# Patient Record
Sex: MALE | Race: AMERICAN INDIAN OR ALASKA NATIVE | NOT HISPANIC OR LATINO | Employment: FULL TIME | URBAN - METROPOLITAN AREA
[De-identification: names, ages, dates, MRNs, and addresses within clinical notes are randomized per-mention and may not be internally consistent; named-entity substitution may affect disease eponyms.]

---

## 2017-01-27 LAB
CRP SERPL-MCNC: 1 MG/L (ref 0–4.9)
ERYTHROCYTE [SEDIMENTATION RATE] IN BLOOD BY WESTERGREN METHOD: 2 MM/HR (ref 0–30)

## 2017-02-02 ENCOUNTER — TELEPHONE (OUTPATIENT)
Dept: SPORTS MEDICINE | Facility: CLINIC | Age: 62
End: 2017-02-02

## 2017-02-02 DIAGNOSIS — M25.559 ARTHRALGIA OF HIP, UNSPECIFIED LATERALITY: Primary | ICD-10-CM

## 2017-02-02 NOTE — TELEPHONE ENCOUNTER
Possible hip scope, psoas tenotomy, and extensive debridement pending injection with Dr. Diamond and f/u exam with Dr. Tellez

## 2017-02-06 ENCOUNTER — TELEPHONE (OUTPATIENT)
Dept: INTERNAL MEDICINE | Facility: CLINIC | Age: 62
End: 2017-02-06

## 2017-02-12 RX ORDER — LIDOCAINE HYDROCHLORIDE 10 MG/ML
10 INJECTION INFILTRATION; PERINEURAL ONCE
Status: CANCELLED | OUTPATIENT
Start: 2017-02-12 | End: 2017-02-12

## 2017-02-13 ENCOUNTER — HOSPITAL ENCOUNTER (OUTPATIENT)
Facility: OTHER | Age: 62
Discharge: HOME | End: 2017-02-13
Attending: ANESTHESIOLOGY | Admitting: ANESTHESIOLOGY
Payer: COMMERCIAL

## 2017-02-13 ENCOUNTER — HOSPITAL ENCOUNTER (OUTPATIENT)
Dept: RADIOLOGY | Facility: HOSPITAL | Age: 62
Discharge: HOME | End: 2017-02-13
Attending: ORTHOPAEDIC SURGERY | Admitting: ANESTHESIOLOGY
Payer: COMMERCIAL

## 2017-02-13 ENCOUNTER — SURGERY (OUTPATIENT)
Age: 62
End: 2017-02-13

## 2017-02-13 ENCOUNTER — OFFICE VISIT (OUTPATIENT)
Dept: ORTHOPEDICS | Facility: CLINIC | Age: 62
End: 2017-02-13
Payer: COMMERCIAL

## 2017-02-13 ENCOUNTER — OFFICE VISIT (OUTPATIENT)
Dept: SPORTS MEDICINE | Facility: CLINIC | Age: 62
End: 2017-02-13
Payer: COMMERCIAL

## 2017-02-13 VITALS
HEART RATE: 54 BPM | WEIGHT: 225 LBS | SYSTOLIC BLOOD PRESSURE: 153 MMHG | TEMPERATURE: 98 F | HEIGHT: 76 IN | RESPIRATION RATE: 18 BRPM | OXYGEN SATURATION: 96 % | DIASTOLIC BLOOD PRESSURE: 75 MMHG | BODY MASS INDEX: 27.4 KG/M2

## 2017-02-13 VITALS
WEIGHT: 225 LBS | BODY MASS INDEX: 27.4 KG/M2 | HEIGHT: 76 IN | DIASTOLIC BLOOD PRESSURE: 84 MMHG | HEART RATE: 66 BPM | SYSTOLIC BLOOD PRESSURE: 131 MMHG

## 2017-02-13 VITALS
SYSTOLIC BLOOD PRESSURE: 131 MMHG | HEART RATE: 66 BPM | BODY MASS INDEX: 27.4 KG/M2 | DIASTOLIC BLOOD PRESSURE: 84 MMHG | WEIGHT: 225 LBS | HEIGHT: 76 IN

## 2017-02-13 DIAGNOSIS — M25.552 LEFT HIP PAIN: ICD-10-CM

## 2017-02-13 DIAGNOSIS — M25.559 ARTHRALGIA OF HIP, UNSPECIFIED LATERALITY: Primary | ICD-10-CM

## 2017-02-13 DIAGNOSIS — M25.552 LEFT HIP PAIN: Primary | ICD-10-CM

## 2017-02-13 DIAGNOSIS — R52 PAIN: ICD-10-CM

## 2017-02-13 DIAGNOSIS — M70.70 ILIOPSOAS BURSITIS: ICD-10-CM

## 2017-02-13 DIAGNOSIS — S76.019S STRAIN OF HIP AND THIGH, UNSPECIFIED LATERALITY, SEQUELA: Primary | ICD-10-CM

## 2017-02-13 DIAGNOSIS — S76.919S STRAIN OF HIP AND THIGH, UNSPECIFIED LATERALITY, SEQUELA: Primary | ICD-10-CM

## 2017-02-13 DIAGNOSIS — S76.019S: ICD-10-CM

## 2017-02-13 PROCEDURE — 99213 OFFICE O/P EST LOW 20 MIN: CPT | Mod: S$GLB,,, | Performed by: ORTHOPAEDIC SURGERY

## 2017-02-13 PROCEDURE — 73502 X-RAY EXAM HIP UNI 2-3 VIEWS: CPT | Mod: TC,PO,LT

## 2017-02-13 PROCEDURE — 25500020 PHARM REV CODE 255: Performed by: ANESTHESIOLOGY

## 2017-02-13 PROCEDURE — 20610 DRAIN/INJ JOINT/BURSA W/O US: CPT | Mod: LT,,, | Performed by: ANESTHESIOLOGY

## 2017-02-13 PROCEDURE — 73502 X-RAY EXAM HIP UNI 2-3 VIEWS: CPT | Mod: 26,LT,, | Performed by: RADIOLOGY

## 2017-02-13 PROCEDURE — 20610 DRAIN/INJ JOINT/BURSA W/O US: CPT | Performed by: ANESTHESIOLOGY

## 2017-02-13 PROCEDURE — 77002 NEEDLE LOCALIZATION BY XRAY: CPT | Mod: 26,59,, | Performed by: ANESTHESIOLOGY

## 2017-02-13 PROCEDURE — 63600175 PHARM REV CODE 636 W HCPCS: Performed by: PHYSICAL MEDICINE & REHABILITATION

## 2017-02-13 PROCEDURE — 99999 PR PBB SHADOW E&M-EST. PATIENT-LVL III: CPT | Mod: PBBFAC,,, | Performed by: ORTHOPAEDIC SURGERY

## 2017-02-13 PROCEDURE — 99203 OFFICE O/P NEW LOW 30 MIN: CPT | Mod: S$GLB,,, | Performed by: ORTHOPAEDIC SURGERY

## 2017-02-13 PROCEDURE — 99999 PR PBB SHADOW E&M-EST. PATIENT-LVL IV: CPT | Mod: PBBFAC,,, | Performed by: ORTHOPAEDIC SURGERY

## 2017-02-13 PROCEDURE — 25000003 PHARM REV CODE 250: Performed by: PHYSICAL MEDICINE & REHABILITATION

## 2017-02-13 PROCEDURE — 77002 NEEDLE LOCALIZATION BY XRAY: CPT | Performed by: ANESTHESIOLOGY

## 2017-02-13 PROCEDURE — 25000003 PHARM REV CODE 250: Performed by: ANESTHESIOLOGY

## 2017-02-13 RX ORDER — LOSARTAN POTASSIUM 50 MG/1
50 TABLET ORAL EVERY MORNING
COMMUNITY

## 2017-02-13 RX ORDER — BUPIVACAINE HYDROCHLORIDE 2.5 MG/ML
10 INJECTION, SOLUTION EPIDURAL; INFILTRATION; INTRACAUDAL ONCE
Status: DISCONTINUED | OUTPATIENT
Start: 2017-02-13 | End: 2017-02-13 | Stop reason: HOSPADM

## 2017-02-13 RX ORDER — ESZOPICLONE 1 MG/1
3 TABLET, FILM COATED ORAL NIGHTLY PRN
COMMUNITY
Start: 2017-02-03

## 2017-02-13 RX ORDER — LIDOCAINE HYDROCHLORIDE 10 MG/ML
INJECTION INFILTRATION; PERINEURAL
Status: DISCONTINUED | OUTPATIENT
Start: 2017-02-13 | End: 2017-02-13 | Stop reason: HOSPADM

## 2017-02-13 RX ORDER — SODIUM CHLORIDE 9 MG/ML
10 INJECTION, SOLUTION INTRAMUSCULAR; INTRAVENOUS; SUBCUTANEOUS ONCE
Status: DISCONTINUED | OUTPATIENT
Start: 2017-02-13 | End: 2017-02-13 | Stop reason: HOSPADM

## 2017-02-13 RX ORDER — TRIAMCINOLONE ACETONIDE 40 MG/ML
40 INJECTION, SUSPENSION INTRA-ARTICULAR; INTRAMUSCULAR ONCE
Status: COMPLETED | OUTPATIENT
Start: 2017-02-13 | End: 2017-02-13

## 2017-02-13 RX ORDER — BUPIVACAINE HYDROCHLORIDE 2.5 MG/ML
INJECTION, SOLUTION INFILTRATION; PERINEURAL
Status: DISCONTINUED | OUTPATIENT
Start: 2017-02-13 | End: 2017-02-13 | Stop reason: HOSPADM

## 2017-02-13 RX ORDER — VORTIOXETINE 20 MG/1
TABLET, FILM COATED ORAL EVERY MORNING
COMMUNITY
Start: 2017-01-26

## 2017-02-13 RX ORDER — ASPIRIN 81 MG/1
81 TABLET ORAL EVERY MORNING
COMMUNITY
End: 2017-03-03

## 2017-02-13 RX ORDER — ESZOPICLONE 3 MG/1
3 TABLET, FILM COATED ORAL NIGHTLY
COMMUNITY
Start: 2017-01-30

## 2017-02-13 RX ORDER — ESZOPICLONE 2 MG/1
3 TABLET, FILM COATED ORAL NIGHTLY
COMMUNITY
End: 2017-02-14 | Stop reason: DRUGHIGH

## 2017-02-13 RX ORDER — CELECOXIB 200 MG/1
200 CAPSULE ORAL 2 TIMES DAILY
Refills: 1 | COMMUNITY
Start: 2016-12-03 | End: 2017-02-14

## 2017-02-13 RX ADMIN — TRIAMCINOLONE ACETONIDE 40 MG: 40 INJECTION, SUSPENSION INTRA-ARTICULAR; INTRAMUSCULAR at 12:02

## 2017-02-13 RX ADMIN — SODIUM BICARBONATE 5 ML: 0.2 INJECTION, SOLUTION INTRAVENOUS at 12:02

## 2017-02-13 RX ADMIN — IOHEXOL 50 ML: 300 INJECTION, SOLUTION INTRAVENOUS at 12:02

## 2017-02-13 RX ADMIN — BUPIVACAINE HYDROCHLORIDE 10 ML: 2.5 INJECTION, SOLUTION INFILTRATION; PERINEURAL at 12:02

## 2017-02-13 RX ADMIN — LIDOCAINE HYDROCHLORIDE 20 ML: 10 INJECTION, SOLUTION INFILTRATION; PERINEURAL at 12:02

## 2017-02-13 NOTE — IP AVS SNAPSHOT
[facility] [place], operated by [facility]  [address] [place], [place] [address]  Phone: [phone]           Patient Discharge Instructions     Our goal is to set you up for success. This packet includes information on your condition, medications, and your home care. It will help you to care for yourself so you don't get sicker and need to go back to the hospital.     Please ask your nurse if you have any questions.        There are many details to remember when preparing to leave the hospital. Here is what you will need to do:    1. Take your medicine. If you are prescribed medications, review your Medication List in the following pages. You may have new medications to  at the pharmacy and others that you'll need to stop taking. Review the instructions for how and when to take your medications. Talk with your doctor or nurses if you are unsure of what to do.     2. Go to your follow-up appointments. Specific follow-up information is listed in the following pages. Your may be contacted by a transition nurse or clinical provider about future appointments. Be sure we have all of the phone numbers to reach you, if needed. Please contact your provider's office if you are unable to make an appointment.     3. Watch for warning signs. Your doctor or nurse will give you detailed warning signs to watch for and when to call for assistance. These instructions may also include educational information about your condition. If you experience any of warning signs to your health, call your doctor.               [facility]  Unless otherwise directed by your provider, please contact [facility], our nurse care line that is available for 24/7 assistance.     [phone] (toll-free)    Registered nurses in the [facility] provide clinical advisement, health education, appointment booking, and other advisory services.                    ** Verify the list of medication(s) below is accurate and up to  date. Carry this with you in case of emergency. If your medications have changed, please notify your healthcare provider.             Medication List      ASK your doctor about these medications        Additional Info                      eszopiclone 2 MG Tab   Commonly known as:  LUNESTA   Refills:  0   Dose:  3 mg   Indications:  Insomnia    Instructions:  Take 3 mg by mouth every evening.     Begin Date    AM    Noon    PM    Bedtime       losartan 50 MG tablet   Commonly known as:  COZAAR   Refills:  0   Dose:  50 mg   Indications:  Hypertension    Instructions:  Take 50 mg by mouth once daily.     Begin Date    AM    Noon    PM    Bedtime                  Please bring to all follow up appointments:    1. A copy of your discharge instructions.  2. All medicines you are currently taking in their original bottles.  3. Identification and insurance card.    Please arrive 15 minutes ahead of scheduled appointment time.    Please call 24 hours in advance if you must reschedule your appointment and/or time.        Your Future Surgeries/Procedures     Feb 14, 2017   Surgery with Jazmine Tellez MD   [facility] ([facility])    [address]  [facility] [id number] [phone]              Follow-up Information     Call Rian Diamond MD.    Specialty:  Pain Medicine    Why:  As needed, If symptoms worsen    Contact information:    [address]  [address]  [facility] [id number] [phone]            Discharge Instructions       Home Care Instructions Pain Management:    1. DIET:   You may resume your normal diet today.   2. BATHING:   You may shower with luke warm water.  3. DRESSING:   You may remove your bandage today.   4. ACTIVITY LEVEL:   You may resume your normal activities 24 hrs after your procedure.  5. MEDICATIONS:   You may resume your normal medications today.   6. SPECIAL INSTRUCTIONS:   No heat to the injection site for 24 hrs including, bath or shower, heating pad,  "moist heat, or hot tubs.    Use ice pack to injection site for any pain or discomfort.  Apply ice packs for 20 minute intervals as needed.   If you have received any sedatives by mouth today you may not drive for 12 hours.    If you have received any sedation through your IV, you may not drive for 24 hrs.     PLEASE CALL YOUR DOCTOR IF:  1. Redness or swelling around the injection site.  2. Fever of 101 degrees  3. Drainage (pus) from the injection site.  4. For any continuous bleeding (some dried blood over the incision is normal.)    FOR EMERGENCIES:   If any unusual problems or difficulties occur during clinic hours, call [phone] or 314.         Admission Information     Date & Time Provider Department CSN    2/13/2017 11:49 AM Rian Diamond MD [facility] [id number]      Care Providers     Provider Role Specialty Primary office phone    Rian Diamond MD Attending Provider Pain Medicine [phone]    Rian Diamond MD Surgeon  Pain Medicine [phone]      Your Vitals Were     BP Pulse Temp Resp Height Weight    153/75 (BP Location: Right arm, Patient Position: Lying, BP Method: Automatic) 54 97.7 °F (36.5 °C) (Oral) 18 6' 4" (1.93 m) 102.1 kg (225 lb)    SpO2 BMI             96% 27.39 kg/m2         Recent Lab Values     No lab values to display.      Allergies as of 2/13/2017     No Known Allergies      Advance Directives     An advance directive is a document which, in the event you are no longer able to make decisions for yourself, tells your healthcare team what kind of treatment you do or do not want to receive, or who you would like to make those decisions for you.  If you do not currently have an advance directive, [facility] encourages you to create one.  For more information call:  [phone]), [phone] ([phone]),  or log on to [email or web address].        Language Assistance Services     ATTENTION: Language assistance services are available, free of " charge. Please call [phone].      ATENCIÓN: Si tiffanie nevarez, tiene a austin disposición servicios gratuitos de asistencia lingüística. Llame al [phone].     CHÚ Ý: N?u b?n nói Ti?ng Vi?t, có các d?ch v? h? tr? ngôn ng? mi?n phí dành cho b?n. G?i s? [phone].        [facility] Sign-Up     Activating your [facility] account is as easy as 1-2-3!     1) Visit [email or web address], select Sign Up Now, enter this activation code and your date of birth, then select Next.  [email or web address]  Expires: 3/30/2017  9:24 AM      2) Create a username and password to use when you visit [facility] in the future and select a security question in case you lose your password and select Next.    3) Enter your e-mail address and click Sign Up!    Additional Information  If you have questions, please e-mail [email or web address] or call [phone] to talk to our [facility] staff. Remember, [facility] is NOT to be used for urgent needs. For medical emergencies, dial 911.          [facility] complies with applicable Federal civil rights laws and does not discriminate on the basis of race, color, national origin, age, disability, or sex.

## 2017-02-13 NOTE — OP NOTE
Ischial Tuberosity injection  Time-out taken to identify patient and procedure side prior to starting   the procedure.   Date of Service: 02/13/2017    PCP: Jewel Hussein MD      PROCEDURE: left iliopsoas bursa injection under fluoroscopy.   REASON FOR PROCEDURE: left iliopsoas bursitis     PHYSICIAN: Rian Diamond MD  ASSISTANTS: Mike Torres Jr MD, Bj Perera DO   ANESTHESIA: Xylocaine 1% 9ml with Sodium Bicarbonate 1ml. 3ml per site.   Preop meds: Niravam 0.5mg  MEDICATIONS INJECTED: Kenalog 20mg, bupivacaine 0.25% 8 mL, and sterile saline 2ml  ESTIMATED BLOOD LOSS: None.   COMPLICATIONS: None.   TECHNIQUE: With the patient lying in the supine position the point of tenderness over the iliopsoas bursa was palpated. The area was prepped and draped in the usual   sterile fashion. Local anesthetic was used, given by raising a wheal and   going down to the hub of the 27-gauge needle. A 3-1/2 inch 22-gauge   needle was introduced under fluoroscopy until the tip reached the greater   trochanter. The tip of the needle was advanced into the left iliopsoas bursa. When the tip of the needle was thought   to be in appropriate position, contrast was injected to confirm proper placement. Medication was then injected slowly. The patient tolerated the procedure well.   PAIN BEFORE THE PROCEDURE: 7/10.   PAIN AFTER THE PROCEDURE: 3/10.   The patient was monitored for 20-30 minutes after the procedure and was   given post procedure and discharge instructions to follow at home. The   patient is to follow-up with me in two weeks by calling. The patient was discharged in a stable condtion.

## 2017-02-13 NOTE — MR AVS SNAPSHOT
[facility]  [address]  [facility] [address]  Phone: [phone]                  Rasta Brewster   2017 9:15 AM   Appointment    Description:  Male : 1955   Provider:  Jazmine Tellez MD   Department:  [facility]                To Do List           Goals (5 Years of Data)     None      [facility]     [facility] Nurse Care Line -  Assistance  Registered nurses in the [facility] provide clinical advisement, health education, appointment booking, and other advisory services.  Call for this free service at [phone].             Medications           Message regarding Medications     Verify the changes and/or additions to your medication regime listed below are the same as discussed with your clinician today.  If any of these changes or additions are incorrect, please notify your healthcare provider.             Verify that the below list of medications is an accurate representation of the medications you are currently taking.  If none reported, the list may be blank. If incorrect, please contact your healthcare provider. Carry this list with you in case of emergency.                Clinical Reference Information           Allergies as of 2017     No Known Allergies      Immunizations Administered on Date of Encounter - 2017     None      [facility] Sign[facility]Up     Activating your [facility] account is as easy as 1-2-3!     1) Visit [email or web address], select Sign Up Now, enter this activation code and your date of birth, then select Next.  [email or web address]  Expires: 3/30/2017  9:24 AM      2) Create a username and password to use when you visit [facility] in the future and select a security question in case you lose your password and select Next.    3) Enter your e-mail address and click Sign Up!    Additional Information  If you have questions, please e-mail [email or web address] or call [phone] to talk to our [facility]  staff. Remember, [facility] is NOT to be used for urgent needs. For medical emergencies, dial 911.         Language Assistance Services     ATTENTION: Language assistance services are available, free of charge. Please call [phone].      ATENCIÓN: Si habla roque, tiene a austin disposición servicios gratuitos de asistencia lingüística. Llame al [phone].     CHÚ Ý: N?u b?n nói Ti?ng Vi?t, có các d?ch v? h? tr? ngôn ng? mi?n phí dành cho b?n. G?i s? [phone].         [facility] complies with applicable Federal civil rights laws and does not discriminate on the basis of race, color, national origin, age, disability, or sex.

## 2017-02-13 NOTE — PROGRESS NOTES
CC:left hip pain    HPI:   Rasta Brewster is a pleasant 61 y.o. patient who reports to clinic with left hip pain. Today the patient rates pain at a 5/10 on visual analog scale.        2 months duration, - traumatic [facility], [facility]  Getting in and out of car + pain    He is s/p left ROCCO (anterior) about 14 weeks ago. Initially did well but developed left hip pain 4-5 weeks post-op. Was put on crutches without much relief. Has tried celebrex without much relief.   He works as the  of a company.     Affecting ADLs and exercising      Review of Systems   Constitution: Negative. Negative for chills, fever and night sweats.   HENT: Negative for congestion and headaches.    Eyes: Negative for blurred vision, left vision loss and right vision loss.   Cardiovascular: Negative for chest pain and syncope.   Respiratory: Negative for cough and shortness of breath.    Endocrine: Negative for polydipsia, polyphagia and polyuria.   Hematologic/Lymphatic: Negative for bleeding problem. Does not bruise/bleed easily.   Skin: Negative for dry skin, itching and rash.   Musculoskeletal: Negative for falls and muscle weakness.   Gastrointestinal: Negative for abdominal pain and bowel incontinence.   Genitourinary: Negative for bladder incontinence and nocturia.   Neurological: Negative for disturbances in coordination, loss of balance and seizures.   Psychiatric/Behavioral: Negative for depression. The patient does not have insomnia.    Allergic/Immunologic: Negative for hives and persistent infections.     PAST MEDICAL HISTORY:   Past Medical History   Diagnosis Date    Anxiety     Hypertension      PAST SURGICAL HISTORY:   Past Surgical History   Procedure Laterality Date    Back surgery      Elbow surgery      Foot surgery      Hip surgery      Joint replacement      Knee arthroscopy      Shoulder arthroscopy       FAMILY HISTORY: No family history on file.  SOCIAL HISTORY:   Social History     Social  "History    Marital status: Single     Spouse name: N/A    Number of children: N/A    Years of education: N/A     Occupational History    Not on file.     Social History Main Topics    Smoking status: Never Smoker    Smokeless tobacco: Never Used    Alcohol use Yes      Comment: occasionally 2-3    Drug use: No    Sexual activity: Not on file     Other Topics Concern    Not on file     Social History Narrative    No narrative on file       MEDICATIONS:   Current Outpatient Prescriptions:     eszopiclone (LUNESTA) 2 MG Tab, Take 3 mg by mouth every evening., Disp: , Rfl:     losartan (COZAAR) 50 MG tablet, Take 50 mg by mouth once daily., Disp: , Rfl:   ALLERGIES: Review of patient's allergies indicates:  No Known Allergies    VITAL SIGNS:   Visit Vitals    /84    Pulse 66    Ht 6' 4" (1.93 m)    Wt 102.1 kg (225 lb)    BMI 27.39 kg/m2          PHYSICAL EXAM / HIP  PHYSICAL EXAMINATION  General:  The patient is alert and oriented x 3.  Mood is pleasant.  Observation of ears, eyes and nose reveal no gross abnormalities.  HEENT: NCAT, sclera nonicteric  Lungs: Respirations are equal and unlabored.    left HIP EXAMINATION     OBSERVATION / INSPECTION  Gait:   Nonantalgic   Alignment:  Neutral   Scars:   None   Muscle atrophy: None   Effusion:  None   Warmth:  None   Discoloration:   None   Leg lengths:   Equal   Pelvis:    Level     TENDERNESS / CREPITUS (T/C):      T / C  Trochanteric bursa   - / -  Piriformis    - / -  SI joint    - / -  Psoas tendon   + / -  Rectus insertion  - / -  Adductor insertion  - / -  Pubic symphysis  - / -    ROM: (* = pain)    Flexion:    120 degrees*  External rotation: 30 degrees  Internal rotation with axial load: 10 degrees*  Internal rotation without axial load: 20 degrees  Abduction:  30 degrees*  Adduction:   20 degrees    SPECIAL TESTS:  Pain w/ forced internal rotation (FADIR): +   Pain w/ forced external rotation (ISAIAS): Absent   ISAIAS asymmetry: "     +  Circumduction test:    +  Stinchfield test:    POS  Log roll:      Negative   Snapping hip (internal):   Negative   Sit-up pain:     POS  Resisted sit-up pain:    Negative   Resisted sit-up w/ adductor contraction pain:Negative   Step-down test:    +  Trendelenburg test:    Negative   Bridge test     +    EXTREMITY NEURO-VASCULAR EXAMINATION:   Sensation:  Grossly intact to light touch all dermatomal regions.   Motor Function:  Fully intact motor function at hip, knee, foot and ankle    DTRs;  quadriceps and  achilles 2+.  No clonus and downgoing Babinski.    Vascular status:  DP and PT pulses 2+, brisk capillary refill, symmetric.    Skin: intact, compartments soft.    OTHER FINDINGS:      XRAYS:  AP and Lateral left hip show left ROCCO in place. Anteversion of cup 35 degree.   Decreased offset compared to right side.       A/P  left hip pain, possible impingement vs psoas tendon dysfunction     Anesthetic & cortisone injection to left hip with Dr. Diamond   If relief, will plan for left hip arthroscopy with psoas release     Non-op vs. Operative intervention risks and benefits explained in detail  All questions were answered, pt will contact us for questions or concerns in the interim.

## 2017-02-13 NOTE — PROGRESS NOTES
CC:   left hip pain  HPI:  61 y.o. yo male who presents with left hip pain.      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  The patient stated his left hip began hurting many years ago which culminated in a left anterior total hip replacement 12 weeks ago.  He states that the pain initially went away but then over the first few weeks began hurting significantly almost even worse than before surgery.  The pain is sharp and stabbing.  The pain is mostly in the groin and at the superior part of the greater trochanter.  It causes him to limp significantly.  He uses a cane as an assistive device.  He Kenalog workout as result of this pain.  It affects him daily.  He has been worked up for infection and psoas impingement.  CRP was normal and he got no relief from the so as injection.  He denies any numbness or coldness or weakness in the left lower extremity.      PAST MEDICAL HISTORY:   Past Medical History   Diagnosis Date    Anxiety     Hypertension      PAST SURGICAL HISTORY:   Past Surgical History   Procedure Laterality Date    Back surgery      Elbow surgery      Foot surgery      Hip surgery      Joint replacement      Knee arthroscopy      Shoulder arthroscopy       FAMILY HISTORY: History reviewed. No pertinent family history.  SOCIAL HISTORY:   Social History     Social History    Marital status:      Spouse name: N/A    Number of children: N/A    Years of education: N/A     Occupational History    Not on file.     Social History Main Topics    Smoking status: Never Smoker    Smokeless tobacco: Never Used    Alcohol use Yes      Comment: occasionally 2-3    Drug use: No    Sexual activity: Not on file     Other Topics Concern    Not on file     Social History Narrative       MEDICATIONS:   Current Outpatient Prescriptions:     aspirin (ECOTRIN) 81 MG EC tablet, Take 81 mg by mouth., Disp: , Rfl:     celecoxib (CELEBREX) 200 MG capsule, Take 200 mg  "by mouth 2 (two) times daily., Disp: , Rfl: 1    eszopiclone (LUNESTA) 1 MG Tab, , Disp: , Rfl:     eszopiclone (LUNESTA) 2 MG Tab, Take 3 mg by mouth every evening., Disp: , Rfl:     eszopiclone 3 mg Tab, , Disp: , Rfl:     losartan (COZAAR) 50 MG tablet, Take 50 mg by mouth once daily., Disp: , Rfl:     TRINTELLIX 20 mg Tab, , Disp: , Rfl:   No current facility-administered medications for this visit.   ALLERGIES: Review of patient's allergies indicates:  No Known Allergies    VITAL SIGNS:   Visit Vitals    /84 (BP Location: Left arm, Patient Position: Sitting, BP Method: Automatic)    Pulse 66    Ht 6' 4" (1.93 m)    Wt 102.1 kg (225 lb)    BMI 27.39 kg/m2        Review of Systems   Constitution: Negative. Negative for chills, fever and night sweats.   HENT: Negative for congestion and headaches.    Eyes: Negative for blurred vision, left vision loss and right vision loss.   Cardiovascular: Negative for chest pain and syncope.   Respiratory: Negative for cough and shortness of breath.    Endocrine: Negative for polydipsia, polyphagia and polyuria.   Hematologic/Lymphatic: Negative for bleeding problem. Does not bruise/bleed easily.   Skin: Negative for dry skin, itching and rash.   Musculoskeletal: Negative for falls and muscle weakness.  left hip pain  Gastrointestinal: Negative for abdominal pain and bowel incontinence.   Genitourinary: Negative for bladder incontinence and nocturia.   Neurological: Negative for disturbances in coordination, loss of balance and seizures.   Psychiatric/Behavioral: Negative for depression. The patient does not have insomnia.    Allergic/Immunologic: Negative for hives and persistent infections.       Physical Exam   Constitutional: Oriented to person, place, and time. Appears well-developed and well-nourished.   HENT:   Head: Normocephalic and atraumatic.   Nose: Nose normal.   Eyes: No scleral icterus.   Neck: Normal range of motion. Neck supple. "   Cardiovascular: Normal rate and regular rhythm.    Pulses:       Radial pulses are 2+ on the right side, and 2+ on the left side.   Pulmonary/Chest: Clear and normal  Abdominal: Soft.   Neurological: Alert and oriented to person, place, and time.   Skin: Skin is warm.   Psychiatric: Normal mood and affect.     His incision is clean dry and intact.  It is approximately 5 cm long.  On examination of his right lower extremity he has full range of motion with internal rotation to 30° external rotation is 70° and flexion to 90°.  There is no pain.  He is neurovascular intact in the right lower extremity.    On examination of the left lower extremity he has internal rotation to 5° maybe 10° which causes significant pain.  External rotation to 60° causes no pain.  Abduction causes significant pain and hip flexion causes significant pain there in the groin.  He is neurovascular intact in the left lower extremity.       Xrays:  Of the left hip are reviewed and my interpretation is as follows: He has an oversize cup which appears to be loose.  His stem has subsided and appears to be undersized.  He is approximately 2 cm short.    Assessment:  Encounter Diagnoses   Name Primary?    Left hip pain Yes       Plan:    Orders Placed This Encounter    CT Lower Extremity Without Contrast Left    Case Request Operating Room: REPLACEMENT-HIP-TOTAL WITH NAVIGATION revision. aleena. posterior.       Return for surgery.      We are very long discussion today about potential treatment options.  Given that he has evidence of subsidence I would like to see his previous x-rays.  Further he is offset is significantly reduced and his leg length is short.  We did discussion about revising to the anterior approach versus posterior approach.  I believe that the most safe approach for this is the posterior approach and he agrees.  He knows that there will be limitations with the posterior approach.  He also knows that there will be  limitations in having a revision surgery in the cup occasions could potentially make it worse than before surgery.  Given all this information both he and I feel that revision is warranted in necessary.  We will proceed with revision surgery on an urgent basis as this is affecting his activities of daily living and has a potential for even worse outcomes if it continues to subside or fractures.    Treatment options were discussed the patient. The surgical process of revision  hip replacement was discussed in detail with Rasta Brewster including a detailed discussion of the procedure itself including bearing options and prognosis. The typical perioperative and post-operative course was discussed and perioperative risks were discussed to the patient's satisfaction.  Risks and complications discussed included but were not limited to the risks of anesthetic complications, infection, fracture, bleeding, wound healing complications, further surgery,  aseptic loosening, instability, limb length inequality, neurologic dysfunction including numbness and wesakness,  DVT, pulmonary embolism, perioperative medical risks (cardiac, pulmonary, renal, neurologic), and death and the patient elects to proceed. The patient is aware of the increased risk of complications with revision surgery.      After the discussion, all patient questions were answered and the patient voiced understanding and was satisfied with proceeding with the procedure.    The patient will obtain medical clearance.

## 2017-02-13 NOTE — LETTER
February 13, 2017      Jazmine Tellez MD  [address]  [facility] [address]           [facility]  [address]  [place] [address]  Phone: [phone]          Patient: Rasta Brewster   MR Number: [id number]   YOB: 1955   Date of Visit: 2/13/2017       Dear Dr. Jazmine Tellez:    Thank you for referring Rasta Brewster to me for evaluation. Attached you will find relevant portions of my assessment and plan of care.    If you have questions, please do not hesitate to call me. I look forward to following Rasta Brewster along with you.    Sincerely,    Alec Montalvo MD    Enclosure  CC:  No Recipients    If you would like to receive this communication electronically, please contact [email or web address] or [phone] to request more information on [facility] [employer] access.    For providers and/or their staff who would like to refer a patient to [facility], please contact us through our one-stop-shop provider referral line, [facility] , at [phone].    If you feel you have received this communication in error or would no longer like to receive these types of communications, please e-mail [email or web address]

## 2017-02-13 NOTE — DISCHARGE INSTRUCTIONS

## 2017-02-14 ENCOUNTER — HOSPITAL ENCOUNTER (OUTPATIENT)
Dept: RADIOLOGY | Facility: HOSPITAL | Age: 62
Discharge: HOME | DRG: 468 | End: 2017-02-14
Attending: NURSE PRACTITIONER
Payer: COMMERCIAL

## 2017-02-14 ENCOUNTER — INITIAL CONSULT (OUTPATIENT)
Dept: INTERNAL MEDICINE | Facility: CLINIC | Age: 62
DRG: 468 | End: 2017-02-14
Payer: COMMERCIAL

## 2017-02-14 ENCOUNTER — LAB VISIT (OUTPATIENT)
Dept: LAB | Facility: HOSPITAL | Age: 62
End: 2017-02-14
Attending: ANESTHESIOLOGY
Payer: COMMERCIAL

## 2017-02-14 ENCOUNTER — HOSPITAL ENCOUNTER (OUTPATIENT)
Dept: CARDIOLOGY | Facility: CLINIC | Age: 62
Discharge: HOME | DRG: 468 | End: 2017-02-14
Payer: COMMERCIAL

## 2017-02-14 ENCOUNTER — OFFICE VISIT (OUTPATIENT)
Dept: ORTHOPEDICS | Facility: CLINIC | Age: 62
DRG: 468 | End: 2017-02-14
Payer: COMMERCIAL

## 2017-02-14 ENCOUNTER — HOSPITAL ENCOUNTER (OUTPATIENT)
Dept: RADIOLOGY | Facility: HOSPITAL | Age: 62
Discharge: HOME | DRG: 468 | End: 2017-02-14
Attending: ORTHOPAEDIC SURGERY
Payer: COMMERCIAL

## 2017-02-14 ENCOUNTER — ANESTHESIA EVENT (OUTPATIENT)
Dept: SURGERY | Facility: HOSPITAL | Age: 62
DRG: 468 | End: 2017-02-14
Payer: COMMERCIAL

## 2017-02-14 VITALS
HEIGHT: 76 IN | TEMPERATURE: 99 F | SYSTOLIC BLOOD PRESSURE: 138 MMHG | DIASTOLIC BLOOD PRESSURE: 84 MMHG | HEART RATE: 78 BPM | WEIGHT: 235 LBS | RESPIRATION RATE: 20 BRPM | BODY MASS INDEX: 28.62 KG/M2

## 2017-02-14 VITALS
HEIGHT: 76 IN | HEART RATE: 78 BPM | BODY MASS INDEX: 28.62 KG/M2 | TEMPERATURE: 99 F | DIASTOLIC BLOOD PRESSURE: 84 MMHG | SYSTOLIC BLOOD PRESSURE: 138 MMHG | WEIGHT: 235 LBS | OXYGEN SATURATION: 95 %

## 2017-02-14 DIAGNOSIS — Z01.818 PRE-OP EXAM: Primary | ICD-10-CM

## 2017-02-14 DIAGNOSIS — T84.019A PROSTHETIC JOINT IMPLANT FAILURE: ICD-10-CM

## 2017-02-14 DIAGNOSIS — M79.602 PAIN OF LEFT UPPER EXTREMITY: Primary | ICD-10-CM

## 2017-02-14 DIAGNOSIS — Z01.818 PRE-OP EXAM: ICD-10-CM

## 2017-02-14 DIAGNOSIS — M79.602 PAIN OF LEFT UPPER EXTREMITY: ICD-10-CM

## 2017-02-14 DIAGNOSIS — F41.9 ANXIETY: ICD-10-CM

## 2017-02-14 DIAGNOSIS — M25.552 LEFT HIP PAIN: ICD-10-CM

## 2017-02-14 DIAGNOSIS — I10 ESSENTIAL HYPERTENSION: ICD-10-CM

## 2017-02-14 DIAGNOSIS — T84.019D PROSTHETIC JOINT IMPLANT FAILURE, SUBSEQUENT ENCOUNTER: ICD-10-CM

## 2017-02-14 DIAGNOSIS — G47.00 INSOMNIA, UNSPECIFIED TYPE: ICD-10-CM

## 2017-02-14 LAB
BILIRUB UR QL STRIP: NEGATIVE
CLARITY UR REFRACT.AUTO: CLEAR
COLOR UR AUTO: YELLOW
GLUCOSE UR QL STRIP: NEGATIVE
HGB UR QL STRIP: NEGATIVE
KETONES UR QL STRIP: NEGATIVE
LEUKOCYTE ESTERASE UR QL STRIP: NEGATIVE
NITRITE UR QL STRIP: NEGATIVE
PH UR STRIP: 6 [PH] (ref 5–8)
PROT UR QL STRIP: NEGATIVE
SP GR UR STRIP: 1.03 (ref 1–1.03)
URN SPEC COLLECT METH UR: NORMAL
UROBILINOGEN UR STRIP-ACNC: 2 EU/DL

## 2017-02-14 PROCEDURE — 73700 CT LOWER EXTREMITY W/O DYE: CPT | Mod: 26,LT,, | Performed by: RADIOLOGY

## 2017-02-14 PROCEDURE — 81003 URINALYSIS AUTO W/O SCOPE: CPT

## 2017-02-14 PROCEDURE — 99499 UNLISTED E&M SERVICE: CPT | Mod: S$GLB,,, | Performed by: NURSE PRACTITIONER

## 2017-02-14 PROCEDURE — 73502 X-RAY EXAM HIP UNI 2-3 VIEWS: CPT | Mod: 26,LT,, | Performed by: RADIOLOGY

## 2017-02-14 PROCEDURE — 99999 PR PBB SHADOW E&M-EST. PATIENT-LVL III: CPT | Mod: PBBFAC,,, | Performed by: HOSPITALIST

## 2017-02-14 PROCEDURE — 99244 OFF/OP CNSLTJ NEW/EST MOD 40: CPT | Mod: S$GLB,,, | Performed by: HOSPITALIST

## 2017-02-14 PROCEDURE — 99999 PR PBB SHADOW E&M-EST. PATIENT-LVL III: CPT | Mod: PBBFAC,,, | Performed by: NURSE PRACTITIONER

## 2017-02-14 PROCEDURE — 93000 ELECTROCARDIOGRAM COMPLETE: CPT | Mod: S$GLB,,, | Performed by: INTERNAL MEDICINE

## 2017-02-14 RX ORDER — IBUPROFEN 200 MG
800 TABLET ORAL DAILY PRN
COMMUNITY

## 2017-02-14 NOTE — PRE ADMISSION SCREENING
Anesthesia Assessment: Preoperative EQUATION    Planned Procedure: Procedure(s) (LRB):  REPLACEMENT-HIP-TOTAL WITH NAVIGATION revision. aleena. posterior.  (Left)  Requested Anesthesia Type:Regional  Surgeon: Alec Montalvo MD  Service: Orthopedics  Known or anticipated Date of Surgery:2/17/2017    Surgeon notes: reviewed    Electronic QUestionnaire Assessment completed via nurse interview with patient.      Rasta Brewster [[id number]] - 61 y.o. Male        Providers Outside of [facility]      No data to display       Surgical Risk Level      Surgical Risk Level:  4           caRDScore (Clinical Anesthesia Rapid Decision Score)        Very Low  Total Score: 7      7 Sum of Clinical Scores       caRDScores (Grouped)      caRDScore - Ane:  4                caRDScore - CVD:  3                caRDScore - Pul:  0                caRDScore - Met:  0                caRDScore - Phy:  0           caRDScore Items           Pre-admit from 2/17/2017 in [facility]     Anesthesia      Has severe degenerative arthritis (osteoarthritis)  Yes     Has had S/P back surgery for Disc/Fusion/Scoliosis  Yes ['99]     Has GERD, hiatal hernia, or chronic heartburn/dyspepsia requiring Rx some or all times  Yes     Has chronic anxiety  Yes     CVD      Activity similar to best ability for maximal activity or exercise  METS 2     Diagnosed with high blood pressure  Yes     Typical BP runs <150/90  Yes     Pulmonary      Metabolic      Physiologic      Obesity Status  Not Obese (BMI <30)       Flags      Red Flag Score:  1                Yellow Flag Score:  6           Red Flags           Pre-admit from 2/17/2017 in [facility]     Obesity Status  Not Obese (BMI <30)     Pain not well controlled at present  Yes       Yellow Flags           Pre-admit from 2/17/2017 in [facility]     Has had steroids in the last year  Yes     Takes herbal medications or vitamin supplements  Yes      Obesity Status  Not Obese (BMI <30)     Aspirin  Yes     NSAID  Yes [ADVIL]     Has pain  Yes       PONV Risk Score (assumes periop narcotic use = +1, Max=4)      PONV Risk Score:  2           PONV Risk Factors  Total Score: 1      1 Non-Smoker at present       Sleep Apnea  Total Score: 0        LETICAI STOP-Bang Risk Factors (Max=8)  Total Score: 3      1 Takes medication for high blood pressure     1 Age >50     1 Male       LETICIA Risk Level - 1 (Low), 2 (Moderate), 3 (High)      LETICIA Risk Level:  2           RCRI (Revised Cardiac Risk Indices of ACC/AHA guidelines, Max=6)  Total Score: 0        CAD Risk Factors  Total Score: 2      1 Male. Age >45     1 Diagnosed with high blood pressure       CHADS Score if applicable (history of atrial fib/flutter, Max=6)  Total Score: 1      1 Diagnosed with high blood pressure       Maximal Exercise Capacity           Pre-admit from 2/17/2017 in [facility]     Maximal Exercise Capacity  METS 2       Summary of Dependence  Total Score: 1      1 Is totally independent of others for activities of daily living       Phone Fraility Score (Max = 17)  Total Score: 1      1 Functional capacity limit: METS 2       Pain Factors           Pre-admit from 2/17/2017 in [facility]     Has pain  Yes     Location and description of pain  HIP PAIN SHARP     Duration of pain  Pain is chronic, has been present greater than 12 mo     Typical Pain Scores  7 to 10     Not using strong pain medications  Yes     Pain not well controlled at present  Yes       Risk Triggers (Evidence-Based Risk Triggers)        Pulmonary Risk Triggers  Total Score: 1      1 Age > or = 60       Renal Risk Triggers  Total Score: 1      1 Diagnosed with high blood pressure       Delirium Risk Triggers  Total Score: 0        Urologic Risk Triggers  Total Score: 0        Logistics        Pre-op Clinic Logistics  Total Score: 12      2 Were any of these episodes of care outside at a  non-[facility] facility?     2 Takes herbal medications or vitamin supplements     1 Major Ambulatory limits (cane, walker, wheelchair, stretcher)     1 Has had anesthesia, either as adult or as a child     2 NSAID     1 Has had S/P back surgery for Disc/Fusion/Scoliosis     1 Takes medication for high blood pressure     2 Pain not well controlled at present       [facility] Logistics  Total Score: 2      1 Major Ambulatory limits (cane, walker, wheelchair, stretcher)     1 NSAID       Discharge Logistics  Total Score: 6      2 Functional capacity limit: METS 2     2 Major Ambulatory limits (cane, walker, wheelchair, stretcher)     1 Has severe degenerative arthritis (osteoarthritis)     1 Has had S/P back surgery for Disc/Fusion/Scoliosis       Discharge Planning           Pre-admit from 2/17/2017 in [facility]     Discharge Planning      Discharge plans  Home with assistance     Will assist patient 24/7, if needed  [employer] WIFE      Who will transport you to therapy, if need  FAMILY       Anes <For office use only>      Total Score: 9        Surgical Risk Level Assessment       Triage considerations:     The patient has no apparent active cardiac condition (No unstable coronary Syndrome such as severe unstable angina or recent [<1 month] myocardial infarction, decompensated CHF, severe valvular   disease or significant arrhythmia)    Previous anesthesia records:Not available    Last PCP note: outside [facility]   Other important co-morbidities:      Tests already available:  No recent tests.            Instructions given. (See in Nurse's note)    Optimization:  Anesthesia Preop Clinic Assessment  Indicated    Medical Opinion Indicated  DR ANDRADE           Plan:    Testing:  CBC, CMP, PT/INR, EKG and UA   Pre-anesthesia  visit       Visit focus: possible regional anesthesia and/or nerve block      Consultation:IM Perioperative Hospitalist    Navigation: Tests Scheduled.              Consults  scheduled.             Results will be tracked by Preop Clinic.

## 2017-02-14 NOTE — ANESTHESIA PREPROCEDURE EVALUATION
Anesthesia Assessment: Preoperative EQUATION    Planned Procedure: Procedure(s) (LRB):  REPLACEMENT-HIP-TOTAL WITH NAVIGATION revision. aleena. posterior.  (Left)  Requested Anesthesia Type:Regional  Surgeon: Alec Montalvo MD  Service: Orthopedics  Known or anticipated Date of Surgery:2/17/2017    Surgeon notes: reviewed    Electronic QUestionnaire Assessment completed via nurse interview with patient.      Rasta Brewster [[id number]] - 61 y.o. Male        Providers Outside of [facility]      No data to display       Surgical Risk Level      Surgical Risk Level:  4           caRDScore (Clinical Anesthesia Rapid Decision Score)        Very Low  Total Score: 7      7 Sum of Clinical Scores       caRDScores (Grouped)      caRDScore - Ane:  4                caRDScore - CVD:  3                caRDScore - Pul:  0                caRDScore - Met:  0                caRDScore - Phy:  0           caRDScore Items           Pre-admit from 2/17/2017 in [facility]     Anesthesia      Has severe degenerative arthritis (osteoarthritis)  Yes     Has had S/P back surgery for Disc/Fusion/Scoliosis  Yes ['99]     Has GERD, hiatal hernia, or chronic heartburn/dyspepsia requiring Rx some or all times  Yes     Has chronic anxiety  Yes     CVD      Activity similar to best ability for maximal activity or exercise  METS 2     Diagnosed with high blood pressure  Yes     Typical BP runs <150/90  Yes     Pulmonary      Metabolic      Physiologic      Obesity Status  Not Obese (BMI <30)       Flags      Red Flag Score:  1                Yellow Flag Score:  6           Red Flags           Pre-admit from 2/17/2017 in [facility]     Obesity Status  Not Obese (BMI <30)     Pain not well controlled at present  Yes       Yellow Flags           Pre-admit from 2/17/2017 in [facility]     Has had steroids in the last year  Yes     Takes herbal medications or vitamin supplements  Yes      Obesity Status  Not Obese (BMI <30)     Aspirin  Yes     NSAID  Yes [ADVIL]     Has pain  Yes       PONV Risk Score (assumes periop narcotic use = +1, Max=4)      PONV Risk Score:  2           PONV Risk Factors  Total Score: 1      1 Non-Smoker at present       Sleep Apnea  Total Score: 0        LETICIA STOP-Bang Risk Factors (Max=8)  Total Score: 3      1 Takes medication for high blood pressure     1 Age >50     1 Male       LETICIA Risk Level - 1 (Low), 2 (Moderate), 3 (High)      LETICIA Risk Level:  2           RCRI (Revised Cardiac Risk Indices of ACC/AHA guidelines, Max=6)  Total Score: 0        CAD Risk Factors  Total Score: 2      1 Male. Age >45     1 Diagnosed with high blood pressure       CHADS Score if applicable (history of atrial fib/flutter, Max=6)  Total Score: 1      1 Diagnosed with high blood pressure       Maximal Exercise Capacity           Pre-admit from 2/17/2017 in [facility]     Maximal Exercise Capacity  METS 2       Summary of Dependence  Total Score: 1      1 Is totally independent of others for activities of daily living       Phone Fraility Score (Max = 17)  Total Score: 1      1 Functional capacity limit: METS 2       Pain Factors           Pre-admit from 2/17/2017 in [facility]     Has pain  Yes     Location and description of pain  HIP PAIN SHARP     Duration of pain  Pain is chronic, has been present greater than 12 mo     Typical Pain Scores  7 to 10     Not using strong pain medications  Yes     Pain not well controlled at present  Yes       Risk Triggers (Evidence-Based Risk Triggers)        Pulmonary Risk Triggers  Total Score: 1      1 Age > or = 60       Renal Risk Triggers  Total Score: 1      1 Diagnosed with high blood pressure       Delirium Risk Triggers  Total Score: 0        Urologic Risk Triggers  Total Score: 0        Logistics        Pre-op Clinic Logistics  Total Score: 12      2 Were any of these episodes of care outside at a  non-[facility] facility?     2 Takes herbal medications or vitamin supplements     1 Major Ambulatory limits (cane, walker, wheelchair, stretcher)     1 Has had anesthesia, either as adult or as a child     2 NSAID     1 Has had S/P back surgery for Disc/Fusion/Scoliosis     1 Takes medication for high blood pressure     2 Pain not well controlled at present       [facility] Logistics  Total Score: 2      1 Major Ambulatory limits (cane, walker, wheelchair, stretcher)     1 NSAID       Discharge Logistics  Total Score: 6      2 Functional capacity limit: METS 2     2 Major Ambulatory limits (cane, walker, wheelchair, stretcher)     1 Has severe degenerative arthritis (osteoarthritis)     1 Has had S/P back surgery for Disc/Fusion/Scoliosis       Discharge Planning           Pre-admit from 2/17/2017 in [facility]     Discharge Planning      Discharge plans  Home with assistance     Will assist patient 24/7, if needed  [employer] WIFE      Who will transport you to therapy, if need  FAMILY       Anes <For office use only>      Total Score: 9        Surgical Risk Level Assessment       Triage considerations:     The patient has no apparent active cardiac condition (No unstable coronary Syndrome such as severe unstable angina or recent [<1 month] myocardial infarction, decompensated CHF, severe valvular   disease or significant arrhythmia)    Previous anesthesia records:Not available    Last PCP note: outside [facility]   Other important co-morbidities:      Tests already available:  No recent tests.            Instructions given. (See in Nurse's note)    Optimization:  Anesthesia Preop Clinic Assessment  Indicated    Medical Opinion Indicated  DR ANDRADE           Plan:    Testing:  CBC, CMP, PT/INR, EKG and UA   Pre-anesthesia  visit       Visit focus: possible regional anesthesia and/or nerve block      Consultation:IM Perioperative Hospitalist    Navigation: Tests Scheduled.              Consults  scheduled.             Results will be tracked by Preop Clinic.     GISELA GAYLE 2/14/17 02/14/2017  Rasta Brewster is a 61 y.o., male.    [facility] Anesthesia Evaluation         Review of Systems         Anesthesia Plan  Type of Anesthesia, risks & benefits discussed:  Anesthesia Type:  CSE, epidural, general, spinal, regional  Patient's Preference:   Intra-op Monitoring Plan:   Intra-op Monitoring Plan Comments:   Post Op Pain Control Plan:   Post Op Pain Control Plan Comments:   Induction:   IV  Beta Blocker:  Patient is not currently on a Beta-Blocker (No further documentation required).       Informed Consent: Patient understands risks and agrees with Anesthesia plan.  Questions answered. Anesthesia consent signed with patient.  ASA Score: 2     Day of Surgery Review of History & Physical:    H&P update referred to the surgeon.         Ready For Surgery From Anesthesia Perspective.

## 2017-02-14 NOTE — MR AVS SNAPSHOT
[facility] - Pre Op Consult  [address]  1st Floor [address]rium  [facility] [address]  Phone: [phone]                  Rasta Brewster   2017 1:00 PM   Initial consult    Description:  Male : 1955   Provider:  Ursula Vidal MD   [facility]  [facility] Consult           Reason for Visit     Pre-op Exam                To Do List           Future Appointments        Provider Department Dept Phone    2017 2:15 PM EKG, APPT [facility] [phone]    2017 2:40 PM LAB, APPOINTMENT [place] [facility] [phone]    2017 2:55 PM LAB, APPOINTMENT [place] [facility] [phone]    2017 3:30 PM Emiliana Orellana NP [facility] [phone]    2017 4:00 PM [facility] CT1 64- LIMIT 450 LBS [facility] [phone]      Your Future Surgeries/Procedures     2017   Surgery with Alec Montalvo MD   [facility] ([facility])    [address] [id number] [phone]              Goals (5 Years of Data)     None      [facility]     [facility] Care Line -  Assistance  Registered nurses in the [facility] provide clinical advisement, health education, appointment booking, and other advisory services.  Call for this free service at [phone].             Medications           Message regarding Medications     Verify the changes and/or additions to your medication regime listed below are the same as discussed with your clinician today.  If any of these changes or additions are incorrect, please notify your healthcare provider.        STOP taking these medications     celecoxib (CELEBREX) 200 MG capsule Take 200 mg by mouth 2 (two) times daily.           Verify that the below list of medications is an accurate representation of the medications you are currently taking.  If none  "reported, the list may be blank. If incorrect, please contact your healthcare provider. Carry this list with you in case of emergency.           Current Medications     aspirin (ECOTRIN) 81 MG EC tablet Take 81 mg by mouth every morning.     ERGOCALCIFEROL, VITAMIN D2, (VITAMIN D2 ORAL) Take 1,000 Units by mouth every morning.    eszopiclone (LUNESTA) 1 MG Tab 1 mg nightly as needed.     eszopiclone 3 mg Tab 3 mg every evening.     ibuprofen (ADVIL) 200 MG tablet Take 200 mg by mouth every 6 (six) hours as needed for Pain.    losartan (COZAAR) 50 MG tablet Take 50 mg by mouth every morning.     TRINTELLIX 20 mg Tab every morning.            Clinical Reference Information           Your Vitals Were     BP Pulse Temp Height Weight SpO2    138/84 78 98.6 °F (37 °C) 6' 4" (1.93 m) 106.6 kg (235 lb 0.2 oz) 95%    BMI                28.61 kg/m2          Blood Pressure          Most Recent Value    BP  138/84 [left [id number] right]      Allergies as of 2/14/2017     No Known Allergies      Immunizations Administered on Date of Encounter - 2/14/2017     None      [facility] Sign-Up     Activating your [facility] account is as easy as 1-2-3!     1) Visit [email or web address], select Sign Up Now, enter this activation code and your date of birth, then select Next.  [email or web address]  Expires: 3/30/2017  9:24 AM      2) Create a username and password to use when you visit [facility] in the future and select a security question in case you lose your password and select Next.    3) Enter your e-mail address and click Sign Up!    Additional Information  If you have questions, please e-mail [email or web address] or call [phone] to talk to our [facility] staff. Remember, [facility] is NOT to be used for urgent needs. For medical emergencies, dial 911.         Instructions        Your surgery has been scheduled for:____Friday 2/17____     You should report to:  _____[facility], located on the [place] side of the [facility]" floor of the [facility] ([phone])  ___X___The [facility] Center, located on the [facility] [address] side of the Second floor of the [facility] ([phone])  ______3rd Floor SSCU located on the [facility] [address] side of the [facility] [phone]    Please Note  -Tell your doctors if you take asprin, products containing aspirin, herbal medications or blood thinners, such as Coumadin, Ticlid, or Plavix. (Consult your provider regarding holding or stopping before surgery).  -Arrange for someone to drive you home following surgery. You will not be allowed to leave the surgical facility alone or drive yourself home following sedation and anesthesia.      Outpatient Encounter Prescriptions as of 2/14/2017   Medication Sig Note Dispense Refill    aspirin (ECOTRIN) 81 MG EC tablet Take 81 mg by mouth every morning.  2/14/2017: Take AM of surgery      ERGOCALCIFEROL, VITAMIN D2, (VITAMIN D2 ORAL) Take 1,000 Units by mouth every morning. 2/14/2017: Hold AM of surgery      eszopiclone (LUNESTA) 1 MG Tab 1 mg nightly as needed.  2/14/2017: Take as needed      eszopiclone 3 mg Tab 3 mg every evening.  2/14/2017: Take as sched PM      ibuprofen (ADVIL) 200 MG tablet Take 200 mg by mouth every 6 (six) hours as needed for Pain. 2/14/2017: Hold until after surgery      losartan (COZAAR) 50 MG tablet Take 50 mg by mouth every morning.  2/14/2017: Hold AM of surgery      TRINTELLIX 20 mg Tab every morning.  2/14/2017: Take AM of surgery                  1     No facility-administered encounter medications on file as of 2/14/2017.          Please review the instructions regarding your above listed medications.    Before Surgery  -Stop taking all herbal medications 14 days prior to surgery  -Stop taking asprin, products containing asprin 7 days before surgery  -Stop taking blood thinners   days before surgery  -Refrain from drinking alcoholic beverages for 24 hours before and  after surgery  -Stop or limit smoking   days before surgery    Night before Surgery  -DO NOT EAT OR DRINK ANYTHING AFTER MIDNIGHT, INCLUDING GUM, HARD CANDY, MINTS, OR CHEWING TOBACCO  -Take a shower or bath (shower is recommended). Bathe with Hibiciens soap or an antibacterial soap from the neck down. If not supplied by your surgeon, Hibiciens soap will need to be purchased over the counter in the pharmacy. Rinse soap off thoroughly.  -Shampoo your hair with your regular shampoo    The Day of Surgery  -Take another bath or shower with Hibiciens or any antibacterial soap, to reduce the chance of infection.  -Take heart and blood pressure medications with a small sip of water, as advised by the perioperative team.  -Do not take fluid pills  -You may brush your teeth and rinse your mouth, but do not swallow any additional water.  -Do not apply perfumes, powder, body lotions, or deodorant on the day of surgery.  -Nail polish should be removed  -Do not wear makeup or moisturizer  -Wear comfortable clothes, such as a button front shirt and loose fitting pants.  -Leave all jewelry, including body piercings, and valuables at home.  -Bring any devices you will need after surgery such as crutches or canes.  -If you have sleep apnea, please bring your CPAP machine    In the event of your physical conditions including the onset of a cold or respiratory illness, or if you have to delay or cancel your surgery, please notify your surgeon.     If you have any questions or concerns, please don't hesitate to call.    Sincerely,    Yisel [phone]         Language Assistance Services     ATTENTION: Language assistance services are available, free of charge. Please call [phone].      ATENCIÓN: Si habla español, tiene a austin disposición servicios gratuitos de asistencia lingüística. Adarsh ruvalcaba [phone].     ROSEMARIE Ý: N?u b?n nói Ti?ng Vi?t, có các d?ch v? h? tr? ngôn ng? mi?n phí dành cho b?n. G?i s? [phone].          [facility] complies with applicable Federal civil rights laws and does not discriminate on the basis of race, color, national origin, age, disability, or sex.

## 2017-02-14 NOTE — LETTER
February 14, 2017      Alec Montalvo MD  [address] [facility] [address]           [facility]  [address] [facility] [address]  1st Floor [address]  [facility] [address]  Phone: [phone]          Patient: Rasta Brewster   MR Number: [id number]   YOB: 1955   Date of Visit: 2/14/2017       Dear Dr. Alec Montalvo:    Thank you for referring Rasta Brewster to me for evaluation. Attached you will find relevant portions of my assessment and plan of care.    If you have questions, please do not hesitate to call me. I look forward to following Rasta Brewster along with you.    Sincerely,    Ursula Vidal MD    Enclosure  CC:  Jazmine Tellez MD    If you would like to receive this communication electronically, please contact [email or web address] or [phone] to request more information on [facility] access.    For providers and/or their staff who would like to refer a patient to [facility], please contact us through our one-stop-shop provider referral line, [facility], at [phone].    If you feel you have received this communication in error or would no longer like to receive these types of communications, please e-mail [email or web address]

## 2017-02-14 NOTE — PROGRESS NOTES
"Chief complaint-Preoperative evaluation , Perioperative Medical management, complication reduction plan     Date of Evaluation- 02/14/2017    PCP-  Primary Doctor No    Future cases for Rasta Brewster [[id number]]     Case ID Status Date Time Sae Procedure Provider Location    [id number] [facility] 2/17/2017 10:35  REPLACEMENT-HIP-TOTAL WITH NAVIGATION revision. aleena. posterior.  Alec Montalvo MD [[id number]] [facility] OR 2ND F[facility]      Left     History of present illness- I had the pleasure of meeting this pleasant 61 y.o. gentleman in the pre op clinic prior to his elective Orthopedic surgery. The patient is new to me .  Goes by " Vimal"    I have obtained the history by speaking to the patient and by reviewing the electronic health records.    Events leading up to surgery / History of presenting illness -    He has been troubled with moderate- severe  left pain for 2 months . Pain  with activity and relieves  with resting.    Had hip replacement surgery Nov 7 th 2016. Had played competitive basket ball until age 53 years and had knee, shoulder, back , elbow injuries, surgeries  To his understanding no infection    Relevant health conditions of significance for the perioperative period/ History of presenting illness -    Hypertension ,On medication  Home  machine readings -  130-140/75-85  Goal BP < 150/<90  Not a known Diabetic or no kidney disease, Heart disease    Anxiety- controlled  Under psychiatry care   Insomnia  After brother's loss  Lunesta that is helping    Had a stress test as part of regular physical and to his understanding no CAD- last one was Feb 2016    Active cardiac conditions- none    Revised cardiac risk index predictors- None     Functional capacity -Examples of physical activity- active person, works out with a  3 mornings a week, plays golf,carries the clubs,   can take 1 flight of stairs----- He can undertake all the above activities without  chest pain,chest tightness, Shortness of " breath ,dizziness,lightheadedness making his exercise tolerance more  than 4 Mets.        Review of symptoms    ROS    Constitutional - No significant weight changes ,No fever  Eyes- No new vision changes  ENT- STOP BANG - snoring, elevated blood pressure, age over 50, neck circumference over 40 cm and male sex  Body mass index is 28.61 kg/(m^2).  Cardiac-As above   Respiratory- No cough, expectoration and  no hemoptysis  GI- Bowel movements  regular  No overt GI/ blood losses   -No dysuria and  no urinary hesitancy   MS-As above  Neurologic-No unilateral weakness   Psychiatric-anxiety  and  no suicidal, homicidal ideation   Endocrine-  Prednisone use for over 3 weeks -no   Steroid injection Yesterday    Hematological/Lymphatic-No spontaneous bruising, bleeding    Past Medical history- reviewed in EPIC-    Pertinent negatives-   DVT-  Pulmonary embolism-  Heart disease -  Vascular stenting -    Past Surgical history - reviewed in EPIC-    Most recent surgery -Hip Nov 2016  No Anaesthetic,Bleeding ,Cardiac problems with previous surgeries-  No history of delirium -  No history of post operative  nausea, vomiting -    Family history- reviewed in EPIC    Heart disease-none -   Thrombosis- None-  Anaesthetic complications- mother-1960's  Diabetes Mellitus - none  Parents alive in to the 90's    Social history- reviewed in EPIC    Lives with wife  From [place] , currently working in [employer]  Help available post op     Medications and Allergies - reviewed in EPIC    Exam    Physical Exam  Constitutional- Vitals - Body mass index is 28.61 kg/(m^2).,   Vitals:    02/14/17 1304   BP: 138/84   Pulse: 78   Temp: 98.6 °F (37 °C)   sat 95 %  General appearance-Conscious,Coherent  Eyes- No conjunctival icterus,pupils  round  and reactive to light   ENT-Oral cavity- moist  , Hearing grossly normal   Neck- No thyromegaly ,Trachea -central, No jugular venous distension, No Carotid Bruit  Cardiovascular -Heart Sounds- Normal   and  no murmur   , No gallop rhythm   Respiratory - Normal Respiratory Effort, Normal breath sounds and  no wheeze    Peripheral pitting pedal edema-- none ,  varicose veins right lower extremity  and  varicose veins left lower extremity, no calf pain   Gastrointestinal -Soft abdomen, No palpable masses, Non Tender,Liver,Spleen not palpable. No-- free fluid  Musculoskeletal- No finger Clubbing. Strength grossly normal   Lymphatic-No Palpable cervical, axillary,Inguinal lymphadenopathy   Psychiatric - normal effect,Orientation  Rt Dorsalis pedis pulses-palpable    Lt Dorsalis pedis pulses- palpable   Rt Posterior tibial pulses -palpable   Left posterior tibial pulses -palpable   Miscellaneous -  Surgical scarLeft hip   and  no renal bruit   No free fluid    Investigations    Review of Medicine tests    EKG- pending    Review of clinical lab tests-pending  \  Review of old records- Was done and information gathered regards to events leading to surgery and health conditions of significance in the perioperative period        Assessment and plan    New problems to me      Preoperative cardiac risk assessment    The patient  does not have any active cardiac conditions . Revised cardiac risk index predictors- -0--.Functional capacity  Is more than 4 Mets. He will be undergoing a Orthopedic procedure that carries a intermediate risk     The estimated risk of the rate of adverse cardiac outcomes  ---0.4%     No further cardiac work up is indicated prior to proceeding with the surgery     Perioperative Medical management    ASA,Preventive care,Holding for surgery- held for 2 weeks   Bleeding risk with revision discussed    NSAID   Advil 800 mg - very infrequent use   Last took last night   Hold  suggested    Hypertension-  Blood pressure is acceptable .I suggest holding -losartan  on the morning of the surgery and can continue that  post operatively under blood pressure, electrolyte and renal function monitoring as long as  they are acceptable.I suggest addressing pain control as uncontrolled pain can increased blood pressure     Anxiety- controlled   Insomnia    Possible sleep apnea- I suggest a sleep study and suggest caution with usage of medication that can cause respiratory suppression in the perioperative period  potential ramifications of untreated sleep apnea, which could include daytime sleepiness, hypertension, heart disease and stroke were discussed    Abnormal EKG   No suggestion of CAD    Varicose veins- Risk of thrombosis discussed     Preventive perioperative care    Thromboembolic prophylaxis:  His risk factors for thrombosis include surgical procedure and age.I suggest  thromboembolic prophylaxis ( mechanical/pharmacological, weighing the risk benefits of pharmacological agent use considering nilesh procedural bleeding )  during the perioperative period.I suggested being active in the post operative period. I suggest  physical therapy, if felt appropriate  in the post operative period  The patient is a candidate for extended DVT prophylaxis    Postoperative pulmonary complication prophylaxis-Risk factors for post operative pulmonary complications include possible sleep apnea- I suggest incentive Spirometry use,early ambulation and end tidal carbon dioxide  monitoring in the perioperative period.I suggested being active in the post operative period    Renal complication prophylaxis-Risk factors for renal complications include Hypertension . I suggest keeping him well hydrated.I suggested drinking 2 litre's of water a day     Surgical site Infection Prophylaxis-I  suggest appropriate antibiotic for Prophylaxis against Surgical site infections    This visit was focussed on Preoperative evaluation , Perioperative Medical management, complication reduction plans and I suggest that the patient follows up with the primary care ,relevant sub specialists for on going health care      I appreciate the opportunity to be involved in  this  pleasant  gentleman's care.Please feel free to contact me if there were any questions about this consultation     Patient is -optimized- for the above     Dr OLIVER Vidal MD [facility] ( ),FACP   Perioperative Medicine  [facility]   Pager [phone]    Scar - left elbow area  ------------------    2/14- 16 55    Labs from today   Hb,HCT,PLt-N  BUN 28   EKG personally reviewed - nil acute  Called to discuss labs  Hydration  ------------------    2/15- UA no suggestion of UTI    EKG read-Normal sinus rhythm  Minimal voltage criteria for LVH, may be normal variant  Borderline Abnormal ECG  No previous ECGs available  ---------------------    2/16- 16 48     Called to follow up   Left a message to call, if needed

## 2017-02-15 RX ORDER — MUPIROCIN 20 MG/G
1 OINTMENT TOPICAL
Status: CANCELLED | OUTPATIENT
Start: 2017-02-15

## 2017-02-15 NOTE — H&P
CC: Left hip pain    Rasta Brewster is a 61 y.o. male with left hip pain. The left hip began hurting many years ago which culminated in a left anterior total hip replacement 12 weeks ago. He states that the pain initially went away but then over the first few weeks began hurting significantly almost even worse than before surgery. The pain is sharp and stabbing. The pain is mostly in the groin and at the superior part of the greater trochanter. It causes him to limp significantly. He uses a cane as an assistive device. He is not able to workout as a result of this pain. It affects him daily. He has been worked up for infection and psoas impingement.  CRP was normal and he got no relief from the psoas injection. He denies any numbness or coldness or weakness in the left lower extremity. He was found to have subsidence and leg length discrepancy and it was decided to proceed with left hip revision arthroplasty from the posterior approach.    Past Medical History   Diagnosis Date    Anxiety     Arthritis     Cancer      melanoma -operated thrice    Hypertension        Past Surgical History   Procedure Laterality Date    Back surgery      Elbow surgery      Foot surgery      Hip surgery      Joint replacement      Knee arthroscopy      Shoulder arthroscopy         Family History   Problem Relation Age of Onset    Hypertension Mother     Stroke Mother      3    Bladder Cancer Father        Review of patient's allergies indicates:  No Known Allergies      Current Outpatient Prescriptions:     aspirin (ECOTRIN) 81 MG EC tablet, Take 81 mg by mouth every morning. , Disp: , Rfl:     ERGOCALCIFEROL, VITAMIN D2, (VITAMIN D2 ORAL), Take 1,000 Units by mouth every morning., Disp: , Rfl:     eszopiclone (LUNESTA) 1 MG Tab, 3 mg nightly as needed. , Disp: , Rfl:     eszopiclone 3 mg Tab, 3 mg every evening. , Disp: , Rfl:     ibuprofen (ADVIL) 200 MG tablet, Take 800 mg by mouth daily as needed for Pain. ,  "Disp: , Rfl:     losartan (COZAAR) 50 MG tablet, Take 50 mg by mouth every morning. , Disp: , Rfl:     TRINTELLIX 20 mg Tab, every morning. , Disp: , Rfl:     Review of Systems:   Constitutional: no fever or chills, pain well controlled  Eyes: no visual changes  ENT: no nasal congestion or sore throat  Respiratory: no cough or shortness of breath  Cardiovascular: no chest pain or palpitations  Gastrointestinal: no nausea or vomiting, tolerating diet  Genitourinary: no hematuria or dysuria  Integument/Breast: no rash or pruritis  Hematologic/Lymphatic: no easy bruising or lymphadenopathy  Musculoskeletal: c/o left hip pain which is sharp and stabbing and causes him to limp  Neurological: no seizures or tremors  Behavioral/Psych: no auditory or visual hallucinations  Endocrine: no heat or cold intolerance    PE:  Visit Vitals    /84    Pulse 78    Temp 98.6 °F (37 °C) (Oral)    Resp 20    Ht 6' 4" (1.93 m)    Wt 106.6 kg (235 lb 0.2 oz)    BMI 28.61 kg/m2     General: Pleasant, cooperative, NAD   Gait: antalgic  HEENT: NCAT, sclera nonicteric   Lungs: Respirations are clear, equal and unlabored.   CV: S1S2; 2+ bilateral upper and lower extremity pulses.   Skin: Intact throughout LE with no rashes, erythema, or lesions  Extremities: No LE edema, NVI lower extremities    Left Hip Exam:  On examination of the left lower extremity he has internal rotation to 5° maybe 10° which causes significant pain. External rotation to 60° causes no pain. Abduction causes significant pain and hip flexion causes significant pain there in the groin. He is neurovascular intact in the left lower extremity  There is pain with passive range of motion.     Radiographs: Radiographs reveal A hip prosthesis is seen in the left in good position.  mild degenerative changes in the right hip.    Diagnosis: failed hip prosthesis, left    Plan: Left total hip revision arthroplasty     Due to the serious nature of total joint infection " and the high prevalence of community acquired MRSA, vancomycin will be used perioperatively.

## 2017-02-15 NOTE — PROGRESS NOTES
Rasta Brewster is a 61 y.o. year old here today for a pre-operative visit in preparation for a Left total hip revision arthroplasty to be performed by  Dr. Montalvo  on 2/17/17.  he was last seen and treated in the clinic on 2/13/2017. he will be medically optimized by the pre op center. There has been no significant change in medical status since last visit. No fever, chills, malaise, or unexplained weight change.      Allergies, Medications, past medical and surgical history reviewed.    Focused examination performed.    All questions answered. Patient encouraged to call with questions. Contact information given.     Pre, nilesh, and post operative procedures and expectations discussed. Questions were answered. Rasta Brewster has been educated and is ready to proceed with surgery. Approximately 30 minutes was spent discussing surgical outcomes, plans, procedures pre, nilesh, and post operative expections and care.  Surgical consent signed.    Rasta Brewster will contact us if there are any questions, concerns, or changes in medical status prior to surgery.

## 2017-02-16 ENCOUNTER — TELEPHONE (OUTPATIENT)
Dept: ORTHOPEDICS | Facility: CLINIC | Age: 62
End: 2017-02-16

## 2017-02-16 NOTE — TELEPHONE ENCOUNTER
OLIVER/harika for   and informed him that his sx time was scheduled for 10 am tomorrow and he is to arrive to the 2nd floor Sx center at 8 am. The pt was instructed to call me at my ext to confirm he received this message.

## 2017-02-17 ENCOUNTER — ANESTHESIA (OUTPATIENT)
Dept: SURGERY | Facility: HOSPITAL | Age: 62
DRG: 468 | End: 2017-02-17
Payer: COMMERCIAL

## 2017-02-17 ENCOUNTER — HOSPITAL ENCOUNTER (INPATIENT)
Facility: HOSPITAL | Age: 62
LOS: 2 days | Discharge: HOME | DRG: 468 | End: 2017-02-19
Attending: ORTHOPAEDIC SURGERY | Admitting: ORTHOPAEDIC SURGERY
Payer: COMMERCIAL

## 2017-02-17 ENCOUNTER — SURGERY (OUTPATIENT)
Age: 62
End: 2017-02-17

## 2017-02-17 DIAGNOSIS — Z01.818 PRE-OP EXAM: ICD-10-CM

## 2017-02-17 DIAGNOSIS — T84.019A PROSTHETIC JOINT IMPLANT FAILURE: Primary | ICD-10-CM

## 2017-02-17 DIAGNOSIS — T84.019D PROSTHETIC JOINT IMPLANT FAILURE, SUBSEQUENT ENCOUNTER: ICD-10-CM

## 2017-02-17 DIAGNOSIS — M79.602 PAIN OF LEFT UPPER EXTREMITY: ICD-10-CM

## 2017-02-17 LAB
ABO + RH BLD: NORMAL
ANION GAP SERPL CALC-SCNC: 6 MMOL/L
BLD GP AB SCN CELLS X3 SERPL QL: NORMAL
BUN SERPL-MCNC: 16 MG/DL
CALCIUM SERPL-MCNC: 8.3 MG/DL
CHLORIDE SERPL-SCNC: 107 MMOL/L
CO2 SERPL-SCNC: 26 MMOL/L
CREAT SERPL-MCNC: 0.9 MG/DL
EST. GFR  (AFRICAN AMERICAN): >60 ML/MIN/1.73 M^2
EST. GFR  (NON AFRICAN AMERICAN): >60 ML/MIN/1.73 M^2
GLUCOSE SERPL-MCNC: 112 MG/DL (ref 70–110)
GLUCOSE SERPL-MCNC: 116 MG/DL
HCO3 UR-SCNC: 25.9 MMOL/L (ref 24–28)
HCT VFR BLD AUTO: 34.8 %
HCT VFR BLD CALC: 31 %PCV (ref 36–54)
HGB BLD-MCNC: 12.3 G/DL
PCO2 BLDA: 50 MMHG (ref 35–45)
PH SMN: 7.32 [PH] (ref 7.35–7.45)
PO2 BLDA: 163 MMHG (ref 40–60)
POC BE: 0 MMOL/L
POC IONIZED CALCIUM: 1.12 MMOL/L (ref 1.06–1.42)
POC SATURATED O2: 99 % (ref 95–100)
POC TCO2: 27 MMOL/L (ref 24–29)
POTASSIUM BLD-SCNC: 4 MMOL/L (ref 3.5–5.1)
POTASSIUM SERPL-SCNC: 4 MMOL/L
SAMPLE: ABNORMAL
SODIUM BLD-SCNC: 141 MMOL/L (ref 136–145)
SODIUM SERPL-SCNC: 139 MMOL/L

## 2017-02-17 PROCEDURE — 85014 HEMATOCRIT: CPT

## 2017-02-17 PROCEDURE — 86901 BLOOD TYPING SEROLOGIC RH(D): CPT

## 2017-02-17 PROCEDURE — 25000003 PHARM REV CODE 250: Performed by: ANESTHESIOLOGY

## 2017-02-17 PROCEDURE — C1776 JOINT DEVICE (IMPLANTABLE): HCPCS | Performed by: ORTHOPAEDIC SURGERY

## 2017-02-17 PROCEDURE — 86900 BLOOD TYPING SEROLOGIC ABO: CPT

## 2017-02-17 PROCEDURE — 63600175 PHARM REV CODE 636 W HCPCS: Performed by: ORTHOPAEDIC SURGERY

## 2017-02-17 PROCEDURE — 27134 REVISE HIP JOINT REPLACEMENT: CPT | Mod: LT,,, | Performed by: ORTHOPAEDIC SURGERY

## 2017-02-17 PROCEDURE — 11000001 HC ACUTE MED/SURG PRIVATE ROOM

## 2017-02-17 PROCEDURE — 25000003 PHARM REV CODE 250: Performed by: STUDENT IN AN ORGANIZED HEALTH CARE EDUCATION/TRAINING PROGRAM

## 2017-02-17 PROCEDURE — 63600175 PHARM REV CODE 636 W HCPCS: Performed by: STUDENT IN AN ORGANIZED HEALTH CARE EDUCATION/TRAINING PROGRAM

## 2017-02-17 PROCEDURE — 86920 COMPATIBILITY TEST SPIN: CPT

## 2017-02-17 PROCEDURE — 36415 COLL VENOUS BLD VENIPUNCTURE: CPT

## 2017-02-17 PROCEDURE — 25000003 PHARM REV CODE 250: Performed by: ORTHOPAEDIC SURGERY

## 2017-02-17 PROCEDURE — 85018 HEMOGLOBIN: CPT

## 2017-02-17 PROCEDURE — 0SRB02A REPLACEMENT OF LEFT HIP JOINT WITH METAL ON POLYETHYLENE SYNTHETIC SUBSTITUTE, UNCEMENTED, OPEN APPROACH: ICD-10-PCS | Performed by: ORTHOPAEDIC SURGERY

## 2017-02-17 PROCEDURE — 80048 BASIC METABOLIC PNL TOTAL CA: CPT

## 2017-02-17 PROCEDURE — 0SUE09Z SUPPLEMENT LEFT HIP JOINT, ACETABULAR SURFACE WITH LINER, OPEN APPROACH: ICD-10-PCS | Performed by: ORTHOPAEDIC SURGERY

## 2017-02-17 PROCEDURE — 25000003 PHARM REV CODE 250: Performed by: NURSE PRACTITIONER

## 2017-02-17 PROCEDURE — 71000033 HC RECOVERY, INTIAL HOUR: Performed by: ORTHOPAEDIC SURGERY

## 2017-02-17 PROCEDURE — D9220A PRA ANESTHESIA: Mod: CRNA,,, | Performed by: NURSE ANESTHETIST, CERTIFIED REGISTERED

## 2017-02-17 PROCEDURE — 27201423 OPTIME MED/SURG SUP & DEVICES STERILE SUPPLY: Performed by: ORTHOPAEDIC SURGERY

## 2017-02-17 PROCEDURE — 88305 TISSUE EXAM BY PATHOLOGIST: CPT | Performed by: PATHOLOGY

## 2017-02-17 PROCEDURE — 27800517 HC TRAY,EPIDURAL-CONTINUOUS: Performed by: ANESTHESIOLOGY

## 2017-02-17 PROCEDURE — P9045 ALBUMIN (HUMAN), 5%, 250 ML: HCPCS | Performed by: NURSE ANESTHETIST, CERTIFIED REGISTERED

## 2017-02-17 PROCEDURE — D9220A PRA ANESTHESIA: Mod: ANES,,, | Performed by: ANESTHESIOLOGY

## 2017-02-17 PROCEDURE — 88305 TISSUE EXAM BY PATHOLOGIST: CPT | Mod: 26,,, | Performed by: PATHOLOGY

## 2017-02-17 PROCEDURE — 36000710: Performed by: ORTHOPAEDIC SURGERY

## 2017-02-17 PROCEDURE — 63600175 PHARM REV CODE 636 W HCPCS: Performed by: NURSE ANESTHETIST, CERTIFIED REGISTERED

## 2017-02-17 PROCEDURE — 37000008 HC ANESTHESIA 1ST 15 MINUTES: Performed by: ORTHOPAEDIC SURGERY

## 2017-02-17 PROCEDURE — 36000711: Performed by: ORTHOPAEDIC SURGERY

## 2017-02-17 PROCEDURE — 25000003 PHARM REV CODE 250: Performed by: NURSE ANESTHETIST, CERTIFIED REGISTERED

## 2017-02-17 PROCEDURE — 37000009 HC ANESTHESIA EA ADD 15 MINS: Performed by: ORTHOPAEDIC SURGERY

## 2017-02-17 PROCEDURE — C1713 ANCHOR/SCREW BN/BN,TIS/BN: HCPCS | Performed by: ORTHOPAEDIC SURGERY

## 2017-02-17 PROCEDURE — 0SPB0JZ REMOVAL OF SYNTHETIC SUBSTITUTE FROM LEFT HIP JOINT, OPEN APPROACH: ICD-10-PCS | Performed by: ORTHOPAEDIC SURGERY

## 2017-02-17 PROCEDURE — 63600175 PHARM REV CODE 636 W HCPCS: Performed by: NURSE PRACTITIONER

## 2017-02-17 PROCEDURE — 0SPB09Z REMOVAL OF LINER FROM LEFT HIP JOINT, OPEN APPROACH: ICD-10-PCS | Performed by: ORTHOPAEDIC SURGERY

## 2017-02-17 PROCEDURE — 71000039 HC RECOVERY, EACH ADD'L HOUR: Performed by: ORTHOPAEDIC SURGERY

## 2017-02-17 DEVICE — IMPLANTABLE DEVICE: Type: IMPLANTABLE DEVICE | Site: HIP | Status: FUNCTIONAL

## 2017-02-17 DEVICE — HEAD FEM V40 36MM +2.5MM BIO: Type: IMPLANTABLE DEVICE | Site: HIP | Status: FUNCTIONAL

## 2017-02-17 DEVICE — SCREW BONE 30MM: Type: IMPLANTABLE DEVICE | Site: HIP | Status: FUNCTIONAL

## 2017-02-17 DEVICE — SCREW BONE 40MM: Type: IMPLANTABLE DEVICE | Site: HIP | Status: FUNCTIONAL

## 2017-02-17 RX ORDER — PHENYLEPHRINE HYDROCHLORIDE 10 MG/ML
INJECTION INTRAVENOUS
Status: DISCONTINUED | OUTPATIENT
Start: 2017-02-17 | End: 2017-02-17

## 2017-02-17 RX ORDER — AMOXICILLIN 250 MG
1 CAPSULE ORAL 2 TIMES DAILY
Status: DISCONTINUED | OUTPATIENT
Start: 2017-02-17 | End: 2017-02-19 | Stop reason: HOSPADM

## 2017-02-17 RX ORDER — OXYCODONE AND ACETAMINOPHEN 10; 325 MG/1; MG/1
1 TABLET ORAL EVERY 4 HOURS PRN
Qty: 90 TABLET | Refills: 0 | Status: SHIPPED | OUTPATIENT
Start: 2017-02-17 | End: 2017-03-03

## 2017-02-17 RX ORDER — SODIUM CHLORIDE 0.9 % (FLUSH) 0.9 %
3 SYRINGE (ML) INJECTION EVERY 8 HOURS
Status: DISCONTINUED | OUTPATIENT
Start: 2017-02-18 | End: 2017-02-19 | Stop reason: HOSPADM

## 2017-02-17 RX ORDER — LOSARTAN POTASSIUM 25 MG/1
50 TABLET ORAL EVERY MORNING
Status: DISCONTINUED | OUTPATIENT
Start: 2017-02-18 | End: 2017-02-19 | Stop reason: HOSPADM

## 2017-02-17 RX ORDER — KETAMINE HYDROCHLORIDE 100 MG/ML
INJECTION, SOLUTION INTRAMUSCULAR; INTRAVENOUS
Status: DISCONTINUED | OUTPATIENT
Start: 2017-02-17 | End: 2017-02-17

## 2017-02-17 RX ORDER — ONDANSETRON HYDROCHLORIDE 8 MG/1
8 TABLET, FILM COATED ORAL EVERY 12 HOURS PRN
Qty: 60 TABLET | Refills: 0 | Status: SHIPPED | OUTPATIENT
Start: 2017-02-17 | End: 2017-03-03

## 2017-02-17 RX ORDER — HYDROCODONE BITARTRATE AND ACETAMINOPHEN 5; 325 MG/1; MG/1
1 TABLET ORAL EVERY 4 HOURS PRN
Status: DISCONTINUED | OUTPATIENT
Start: 2017-02-17 | End: 2017-02-19 | Stop reason: HOSPADM

## 2017-02-17 RX ORDER — HYDROMORPHONE HYDROCHLORIDE 1 MG/ML
1 INJECTION, SOLUTION INTRAMUSCULAR; INTRAVENOUS; SUBCUTANEOUS EVERY 4 HOURS PRN
Status: DISCONTINUED | OUTPATIENT
Start: 2017-02-17 | End: 2017-02-19 | Stop reason: HOSPADM

## 2017-02-17 RX ORDER — LIDOCAINE HCL/PF 100 MG/5ML
SYRINGE (ML) INTRAVENOUS
Status: DISCONTINUED | OUTPATIENT
Start: 2017-02-17 | End: 2017-02-17

## 2017-02-17 RX ORDER — POLYETHYLENE GLYCOL 3350 17 G/17G
17 POWDER, FOR SOLUTION ORAL DAILY
Status: DISCONTINUED | OUTPATIENT
Start: 2017-02-18 | End: 2017-02-19 | Stop reason: HOSPADM

## 2017-02-17 RX ORDER — LIDOCAINE HYDROCHLORIDE AND EPINEPHRINE 15; 5 MG/ML; UG/ML
INJECTION, SOLUTION EPIDURAL
Status: DISCONTINUED | OUTPATIENT
Start: 2017-02-17 | End: 2017-02-17

## 2017-02-17 RX ORDER — ACETAMINOPHEN 10 MG/ML
1000 INJECTION, SOLUTION INTRAVENOUS ONCE
Status: COMPLETED | OUTPATIENT
Start: 2017-02-17 | End: 2017-02-17

## 2017-02-17 RX ORDER — OXYCODONE HYDROCHLORIDE 5 MG/1
5 TABLET ORAL EVERY 4 HOURS PRN
Status: DISCONTINUED | OUTPATIENT
Start: 2017-02-17 | End: 2017-02-19 | Stop reason: HOSPADM

## 2017-02-17 RX ORDER — ONDANSETRON 2 MG/ML
4 INJECTION INTRAMUSCULAR; INTRAVENOUS EVERY 12 HOURS PRN
Status: DISCONTINUED | OUTPATIENT
Start: 2017-02-17 | End: 2017-02-19 | Stop reason: HOSPADM

## 2017-02-17 RX ORDER — ONDANSETRON 2 MG/ML
INJECTION INTRAMUSCULAR; INTRAVENOUS
Status: DISCONTINUED | OUTPATIENT
Start: 2017-02-17 | End: 2017-02-17

## 2017-02-17 RX ORDER — CELECOXIB 200 MG/1
400 CAPSULE ORAL ONCE
Status: COMPLETED | OUTPATIENT
Start: 2017-02-17 | End: 2017-02-17

## 2017-02-17 RX ORDER — ASPIRIN 325 MG
325 TABLET ORAL 2 TIMES DAILY
Status: DISCONTINUED | OUTPATIENT
Start: 2017-02-17 | End: 2017-02-19 | Stop reason: HOSPADM

## 2017-02-17 RX ORDER — DEXAMETHASONE SODIUM PHOSPHATE 4 MG/ML
INJECTION, SOLUTION INTRA-ARTICULAR; INTRALESIONAL; INTRAMUSCULAR; INTRAVENOUS; SOFT TISSUE
Status: DISCONTINUED | OUTPATIENT
Start: 2017-02-17 | End: 2017-02-17

## 2017-02-17 RX ORDER — FENTANYL CITRATE 50 UG/ML
INJECTION, SOLUTION INTRAMUSCULAR; INTRAVENOUS
Status: DISCONTINUED | OUTPATIENT
Start: 2017-02-17 | End: 2017-02-17

## 2017-02-17 RX ORDER — DIPHENHYDRAMINE HCL 25 MG
25 CAPSULE ORAL EVERY 6 HOURS PRN
Status: DISCONTINUED | OUTPATIENT
Start: 2017-02-17 | End: 2017-02-19 | Stop reason: HOSPADM

## 2017-02-17 RX ORDER — HYDRALAZINE HYDROCHLORIDE 25 MG/1
25 TABLET, FILM COATED ORAL EVERY 6 HOURS PRN
Status: DISCONTINUED | OUTPATIENT
Start: 2017-02-17 | End: 2017-02-19 | Stop reason: HOSPADM

## 2017-02-17 RX ORDER — CEFAZOLIN SODIUM 2 G/50ML
2 SOLUTION INTRAVENOUS
Status: COMPLETED | OUTPATIENT
Start: 2017-02-17 | End: 2017-02-18

## 2017-02-17 RX ORDER — SODIUM CHLORIDE 9 MG/ML
INJECTION, SOLUTION INTRAVENOUS CONTINUOUS
Status: DISCONTINUED | OUTPATIENT
Start: 2017-02-17 | End: 2017-02-17

## 2017-02-17 RX ORDER — PREGABALIN 75 MG/1
75 CAPSULE ORAL NIGHTLY
Status: DISCONTINUED | OUTPATIENT
Start: 2017-02-17 | End: 2017-02-19 | Stop reason: HOSPADM

## 2017-02-17 RX ORDER — MIDAZOLAM HYDROCHLORIDE 1 MG/ML
INJECTION, SOLUTION INTRAMUSCULAR; INTRAVENOUS
Status: DISCONTINUED | OUTPATIENT
Start: 2017-02-17 | End: 2017-02-17

## 2017-02-17 RX ORDER — DOCUSATE SODIUM 100 MG/1
100 CAPSULE, LIQUID FILLED ORAL 2 TIMES DAILY PRN
Qty: 60 CAPSULE | Refills: 1 | Status: SHIPPED | OUTPATIENT
Start: 2017-02-17 | End: 2017-03-03

## 2017-02-17 RX ORDER — PROPOFOL 10 MG/ML
VIAL (ML) INTRAVENOUS
Status: DISCONTINUED | OUTPATIENT
Start: 2017-02-17 | End: 2017-02-17

## 2017-02-17 RX ORDER — RAMELTEON 8 MG/1
8 TABLET ORAL NIGHTLY PRN
Status: DISCONTINUED | OUTPATIENT
Start: 2017-02-17 | End: 2017-02-19 | Stop reason: HOSPADM

## 2017-02-17 RX ORDER — FAMOTIDINE 20 MG/1
20 TABLET, FILM COATED ORAL 2 TIMES DAILY
Status: DISCONTINUED | OUTPATIENT
Start: 2017-02-17 | End: 2017-02-19 | Stop reason: HOSPADM

## 2017-02-17 RX ORDER — ACETAMINOPHEN 500 MG
1000 TABLET ORAL EVERY 6 HOURS
Status: DISCONTINUED | OUTPATIENT
Start: 2017-02-17 | End: 2017-02-19 | Stop reason: HOSPADM

## 2017-02-17 RX ORDER — ASPIRIN 325 MG
325 TABLET ORAL 2 TIMES DAILY
Qty: 70 TABLET | Refills: 0 | Status: SHIPPED | OUTPATIENT
Start: 2017-02-17 | End: 2017-03-24

## 2017-02-17 RX ORDER — VANCOMYCIN HYDROCHLORIDE 1 G/20ML
INJECTION, POWDER, LYOPHILIZED, FOR SOLUTION INTRAVENOUS
Status: DISCONTINUED | OUTPATIENT
Start: 2017-02-17 | End: 2017-02-17 | Stop reason: HOSPADM

## 2017-02-17 RX ORDER — CELECOXIB 200 MG/1
CAPSULE ORAL
Status: DISPENSED
Start: 2017-02-17 | End: 2017-02-18

## 2017-02-17 RX ORDER — ONDANSETRON 2 MG/ML
4 INJECTION INTRAMUSCULAR; INTRAVENOUS EVERY 6 HOURS PRN
Status: DISCONTINUED | OUTPATIENT
Start: 2017-02-17 | End: 2017-02-17

## 2017-02-17 RX ORDER — CELECOXIB 200 MG/1
200 CAPSULE ORAL DAILY
Status: DISCONTINUED | OUTPATIENT
Start: 2017-02-18 | End: 2017-02-19 | Stop reason: HOSPADM

## 2017-02-17 RX ORDER — ACETAMINOPHEN 10 MG/ML
INJECTION, SOLUTION INTRAVENOUS
Status: DISPENSED
Start: 2017-02-17 | End: 2017-02-18

## 2017-02-17 RX ORDER — LIDOCAINE HYDROCHLORIDE 10 MG/ML
1 INJECTION, SOLUTION EPIDURAL; INFILTRATION; INTRACAUDAL; PERINEURAL ONCE
Status: COMPLETED | OUTPATIENT
Start: 2017-02-17 | End: 2017-02-17

## 2017-02-17 RX ORDER — MUPIROCIN 20 MG/G
1 OINTMENT TOPICAL
Status: COMPLETED | OUTPATIENT
Start: 2017-02-17 | End: 2017-02-17

## 2017-02-17 RX ORDER — ALBUMIN HUMAN 50 G/1000ML
SOLUTION INTRAVENOUS CONTINUOUS PRN
Status: DISCONTINUED | OUTPATIENT
Start: 2017-02-17 | End: 2017-02-17

## 2017-02-17 RX ORDER — OXYCODONE HYDROCHLORIDE 5 MG/1
10 TABLET ORAL EVERY 4 HOURS PRN
Status: DISCONTINUED | OUTPATIENT
Start: 2017-02-17 | End: 2017-02-19 | Stop reason: HOSPADM

## 2017-02-17 RX ORDER — PROPOFOL 10 MG/ML
VIAL (ML) INTRAVENOUS CONTINUOUS PRN
Status: DISCONTINUED | OUTPATIENT
Start: 2017-02-17 | End: 2017-02-17

## 2017-02-17 RX ORDER — NALOXONE HCL 0.4 MG/ML
0.02 VIAL (ML) INJECTION CONTINUOUS PRN
Status: DISCONTINUED | OUTPATIENT
Start: 2017-02-17 | End: 2017-02-19 | Stop reason: HOSPADM

## 2017-02-17 RX ORDER — SODIUM CHLORIDE 9 MG/ML
INJECTION, SOLUTION INTRAVENOUS CONTINUOUS
Status: ACTIVE | OUTPATIENT
Start: 2017-02-17 | End: 2017-02-18

## 2017-02-17 RX ORDER — RAMELTEON 8 MG/1
8 TABLET ORAL NIGHTLY PRN
Status: DISCONTINUED | OUTPATIENT
Start: 2017-02-17 | End: 2017-02-17

## 2017-02-17 RX ADMIN — ASPIRIN 325 MG ORAL TABLET 325 MG: 325 PILL ORAL at 10:02

## 2017-02-17 RX ADMIN — EPHEDRINE SULFATE 5 MG: 50 INJECTION, SOLUTION INTRAMUSCULAR; INTRAVENOUS; SUBCUTANEOUS at 01:02

## 2017-02-17 RX ADMIN — PROPOFOL 30 MG: 10 INJECTION, EMULSION INTRAVENOUS at 12:02

## 2017-02-17 RX ADMIN — MIDAZOLAM HYDROCHLORIDE 2 MG: 1 INJECTION, SOLUTION INTRAMUSCULAR; INTRAVENOUS at 12:02

## 2017-02-17 RX ADMIN — MIDAZOLAM HYDROCHLORIDE 1 MG: 1 INJECTION, SOLUTION INTRAMUSCULAR; INTRAVENOUS at 12:02

## 2017-02-17 RX ADMIN — ACETAMINOPHEN 1000 MG: 10 INJECTION, SOLUTION INTRAVENOUS at 03:02

## 2017-02-17 RX ADMIN — CELECOXIB 400 MG: 200 CAPSULE ORAL at 03:02

## 2017-02-17 RX ADMIN — KETAMINE HYDROCHLORIDE 30 MG: 100 INJECTION, SOLUTION, CONCENTRATE INTRAMUSCULAR; INTRAVENOUS at 12:02

## 2017-02-17 RX ADMIN — PROPOFOL 50 MG: 10 INJECTION, EMULSION INTRAVENOUS at 02:02

## 2017-02-17 RX ADMIN — LIDOCAINE HYDROCHLORIDE 2 MG: 10 INJECTION, SOLUTION EPIDURAL; INFILTRATION; INTRACAUDAL; PERINEURAL at 10:02

## 2017-02-17 RX ADMIN — PROPOFOL 50 MCG/KG/MIN: 10 INJECTION, EMULSION INTRAVENOUS at 12:02

## 2017-02-17 RX ADMIN — MUPIROCIN 1 G: 20 OINTMENT TOPICAL at 10:02

## 2017-02-17 RX ADMIN — SODIUM CHLORIDE: 0.9 INJECTION, SOLUTION INTRAVENOUS at 03:02

## 2017-02-17 RX ADMIN — LIDOCAINE HYDROCHLORIDE AND EPINEPHRINE 3 ML: 15; 5 INJECTION, SOLUTION EPIDURAL at 02:02

## 2017-02-17 RX ADMIN — PHENYLEPHRINE HYDROCHLORIDE 100 MCG: 10 INJECTION INTRAVENOUS at 01:02

## 2017-02-17 RX ADMIN — SODIUM CHLORIDE: 0.9 INJECTION, SOLUTION INTRAVENOUS at 10:02

## 2017-02-17 RX ADMIN — PHENYLEPHRINE HYDROCHLORIDE 100 MCG: 10 INJECTION INTRAVENOUS at 02:02

## 2017-02-17 RX ADMIN — FENTANYL CITRATE 6 ML/HR: 50 INJECTION, SOLUTION INTRAMUSCULAR; INTRAVENOUS at 03:02

## 2017-02-17 RX ADMIN — PREGABALIN 75 MG: 75 CAPSULE ORAL at 10:02

## 2017-02-17 RX ADMIN — KETAMINE HYDROCHLORIDE 20 MG: 100 INJECTION, SOLUTION, CONCENTRATE INTRAMUSCULAR; INTRAVENOUS at 02:02

## 2017-02-17 RX ADMIN — TRANEXAMIC ACID 3000 MG: 100 INJECTION, SOLUTION INTRAVENOUS at 01:02

## 2017-02-17 RX ADMIN — SODIUM CHLORIDE, SODIUM GLUCONATE, SODIUM ACETATE, POTASSIUM CHLORIDE, MAGNESIUM CHLORIDE, SODIUM PHOSPHATE, DIBASIC, AND POTASSIUM PHOSPHATE: .53; .5; .37; .037; .03; .012; .00082 INJECTION, SOLUTION INTRAVENOUS at 12:02

## 2017-02-17 RX ADMIN — EPHEDRINE SULFATE 5 MG: 50 INJECTION, SOLUTION INTRAMUSCULAR; INTRAVENOUS; SUBCUTANEOUS at 02:02

## 2017-02-17 RX ADMIN — FAMOTIDINE 20 MG: 20 TABLET, FILM COATED ORAL at 10:02

## 2017-02-17 RX ADMIN — VANCOMYCIN HYDROCHLORIDE 1500 MG: 1 INJECTION, POWDER, LYOPHILIZED, FOR SOLUTION INTRAVENOUS at 11:02

## 2017-02-17 RX ADMIN — STANDARDIZED SENNA CONCENTRATE AND DOCUSATE SODIUM 1 TABLET: 8.6; 5 TABLET, FILM COATED ORAL at 10:02

## 2017-02-17 RX ADMIN — DIPHENHYDRAMINE HYDROCHLORIDE 25 MG: 25 CAPSULE ORAL at 05:02

## 2017-02-17 RX ADMIN — FENTANYL CITRATE 50 MCG: 50 INJECTION, SOLUTION INTRAMUSCULAR; INTRAVENOUS at 02:02

## 2017-02-17 RX ADMIN — CEFAZOLIN SODIUM 2 G: 2 SOLUTION INTRAVENOUS at 10:02

## 2017-02-17 RX ADMIN — SODIUM CHLORIDE, SODIUM GLUCONATE, SODIUM ACETATE, POTASSIUM CHLORIDE, MAGNESIUM CHLORIDE, SODIUM PHOSPHATE, DIBASIC, AND POTASSIUM PHOSPHATE: .53; .5; .37; .037; .03; .012; .00082 INJECTION, SOLUTION INTRAVENOUS at 01:02

## 2017-02-17 RX ADMIN — ACETAMINOPHEN 1000 MG: 500 TABLET ORAL at 10:02

## 2017-02-17 RX ADMIN — ALBUMIN (HUMAN): 12.5 SOLUTION INTRAVENOUS at 01:02

## 2017-02-17 RX ADMIN — VANCOMYCIN HYDROCHLORIDE 1 G: 1 INJECTION, POWDER, LYOPHILIZED, FOR SOLUTION INTRAVENOUS at 04:02

## 2017-02-17 RX ADMIN — DEXAMETHASONE SODIUM PHOSPHATE 8 MG: 4 INJECTION, SOLUTION INTRAMUSCULAR; INTRAVENOUS at 12:02

## 2017-02-17 RX ADMIN — ONDANSETRON 4 MG: 2 INJECTION INTRAMUSCULAR; INTRAVENOUS at 01:02

## 2017-02-17 RX ADMIN — FENTANYL CITRATE 50 MCG: 50 INJECTION, SOLUTION INTRAMUSCULAR; INTRAVENOUS at 12:02

## 2017-02-17 RX ADMIN — Medication 2 G: at 12:02

## 2017-02-17 RX ADMIN — LIDOCAINE HYDROCHLORIDE 40 SYRINGE: 20 INJECTION, SOLUTION INTRAVENOUS at 12:02

## 2017-02-17 RX ADMIN — MEPIVACAINE HYDROCHLORIDE 6 ML/HR: 15 INJECTION, SOLUTION EPIDURAL; INFILTRATION at 02:02

## 2017-02-17 NOTE — ANESTHESIA PROCEDURE NOTES
CSE    Patient location during procedure: OR  Start time: 2/17/2017 12:25 PM  Timeout: 2/17/2017 12:23 PM  End time: 2/17/2017 12:32 PM  Reason for block: at surgeon's request and post-op pain management  Staffing  Anesthesiologist: DAWSON FANG  Performed by: anesthesiologist   Preanesthetic Checklist  Completed: patient identified, site marked, surgical consent, pre-op evaluation, timeout performed, IV checked, risks and benefits discussed and monitors and equipment checked  CSE  Patient position: sitting  Prep: ChloraPrep  Patient monitoring: heart rate, cardiac monitor, continuous pulse ox and frequent blood pressure checks  Approach: right paramedian  Spinal Needle  Needle type: Rose   Needle gauge: 25 G  Needle length: 5 in  Epidural Needle  Injection technique: ZAYDA air  Needle type: Tuohy   Needle gauge: 17 G  Needle length: 3.5 in  Needle insertion depth: 9 cm  Location: L3-4  Needle localization: anatomical landmarks  Catheter  Catheter type: springw[employer]  Catheter size: 19 G  Catheter at skin depth: 14 cm  Test dose: lidocaine 1.5% with Epi 1-to-200,000  Additional Documentation: incremental injection, negative aspiration for CSF, negative aspiration for heme, no paresthesia on injection and negative test dose  Assessment  Sensory level: T6   Dermatomal levels determined by alcohol swab  Intrathecal Medications:  Bolus administered: 3 mL of 1.5 mepivacaine  administered: primary anesthetic mcg of

## 2017-02-17 NOTE — ANESTHESIA POSTPROCEDURE EVALUATION
"Anesthesia Post Evaluation    Patient: Rasta Brewster    Procedure(s) Performed: Procedure(s) (LRB):  REPLACEMENT-HIP-TOTAL WITH NAVIGATION revision. aleena. posterior.  (Left)    Final Anesthesia Type: general  Patient location during evaluation: PACU  Patient participation: Yes- Able to Participate  Level of consciousness: awake and alert and oriented  Post-procedure vital signs: reviewed and not stable  Pain management: adequate  Airway patency: patent  PONV status at discharge: No PONV  Anesthetic complications: no      Cardiovascular status: blood pressure returned to baseline and hemodynamically stable  Respiratory status: unassisted, spontaneous ventilation and room air  Hydration status: euvolemic  Follow-up not needed.        Visit Vitals    /75    Pulse (!) 59    Temp 36.7 °C (98 °F) (Oral)    Resp (!) 22    Ht 6' 4" (1.93 m)    Wt 101.6 kg (224 lb)    SpO2 96%    BMI 27.27 kg/m2       Pain/Tish Score: Pain Assessment Performed: Yes (2/17/2017  3:30 PM)  Presence of Pain: complains of pain/discomfort (2/17/2017  3:30 PM)  Pain Rating Prior to Med Admin: 8 (2/17/2017  3:34 PM)  Tish Score: 10 (2/17/2017  3:30 PM)      "

## 2017-02-17 NOTE — TRANSFER OF CARE
"Anesthesia Transfer of Care Note    Patient: Rasta Brewster    Procedure(s) Performed: Procedure(s) (LRB):  REPLACEMENT-HIP-TOTAL WITH NAVIGATION revision. aleena. posterior.  (Left)    Patient location: PACU    Anesthesia Type: general    Transport from OR: Transported from OR on 6-10 L/min O2 by face mask with adequate spontaneous ventilation    Post pain: adequate analgesia    Post assessment: no apparent anesthetic complications and tolerated procedure well    Post vital signs: stable    Level of consciousness: awake, oriented and alert    Nausea/Vomiting: no nausea/vomiting    Complications: none          Last vitals:   Visit Vitals    /62    Pulse 79    Temp 36.7 °C (98 °F) (Oral)    Resp 18    Ht 6' 4" (1.93 m)    Wt 101.6 kg (224 lb)    SpO2 99%    BMI 27.27 kg/m2     "

## 2017-02-17 NOTE — PLAN OF CARE
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       No future appointments.  Follow-up Information     Follow up with Alec Montalvo MD In 2 weeks.    Specialty:  Orthopedic Surgery    Contact information:    [address]  [facility] [address]  [phone]              I have seen and examined this patient face to face today. My clinical findings that support the need for the home health skilled services and home bound status are the following:  Weakness/numbness causing balance and gait disturbance due to Joint Replacement making it taxing to leave home.    Allergies:Review of patient's allergies indicates:  No Known Allergies    Diet: regular diet    Activities: activity as tolerated    Home Health Admitting Clinician:   SN/PT to complete comprehensive assessment including routine vital signs. Instruct on disease process and s/s of complications to report to MD. Follow specific home health arthoplasty protocol. Review/verify medication list sent home with the patient at time of discharge  and instruct patient/caregiver as needed. If coumadin ordered, coumadin clinic to manage INR with INR draws 2x per week with a goal to maintain INR between 1.8 and 2.2. Frequency may be adjusted depending on start of care date.    Notify MD if SBP > 160 or < 90; DBP > 90 or < 50; HR > 120 or < 50; Temp > 101    Home Medical Equipment:  Walker, 3-1 bedside commode, transfer tub bench    CONSULTS:    Physical Therapy may admit if patient not on coumadin, PT to perform comprehensive  assessment if performing admit visit and generate therapy plan of care. Evaluate for home safety and equipment needs; Establish/upgrade home exercise program. Perform/instruct on therapeutic exercises, gait training, transfer training, and Range of Motion.      OTHER: (only select if patient needs other therapy disciplines)      MISCELLANEOUS CARE:      WOUND CARE ORDERS:  Assess Surgical Incision/DSRG each TX  Aquacel AG drsg applied post-op leave on 14 days post op. Call MD if any drainage reaches border to border of drsg horizontally, s/s of infection, temp >101, induration, swelling or redness.  If dressing is removed per MD order, then apply island dressing, change/teach caregiver to perform daily dressing change if island dressing present.    Medications: Review discharge medications with patient and family and provide education.      Current Discharge Medication List      START taking these medications    Details   aspirin 325 MG tablet Take 1 tablet (325 mg total) by mouth 2 (two) times daily.  Qty: 70 tablet, Refills: 0      docusate sodium (COLACE) 100 MG capsule Take 1 capsule (100 mg total) by mouth 2 (two) times daily as needed for Constipation.  Qty: 60 capsule, Refills: 1      ondansetron (ZOFRAN) 8 MG tablet Take 1 tablet (8 mg total) by mouth every 12 (twelve) hours as needed for Nausea.  Qty: 60 tablet, Refills: 0      oxycodone-acetaminophen (PERCOCET)  mg per tablet Take 1 tablet by mouth every 4 (four) hours as needed for Pain.  Qty: 90 tablet, Refills: 0         CONTINUE these medications which have NOT CHANGED    Details   aspirin (ECOTRIN) 81 MG EC tablet Take 81 mg by mouth every morning.       !! eszopiclone (LUNESTA) 1 MG Tab 3 mg nightly as needed.       !! eszopiclone 3 mg Tab 3 mg every evening.       losartan (COZAAR) 50 MG tablet Take 50 mg by mouth every morning.       TRINTELLIX 20 mg Tab every morning.       ERGOCALCIFEROL, VITAMIN D2, (VITAMIN D2 ORAL) Take 1,000 Units by  mouth every morning.      ibuprofen (ADVIL) 200 MG tablet Take 800 mg by mouth daily as needed for Pain.        !! - Potential duplicate medications found. Please discuss with provider.          I certify that this patient is confined to his home and needs intermittent skilled nursing care and physical therapy.

## 2017-02-17 NOTE — ANESTHESIA RELEASE NOTE
"Anesthesia Release from PACU Note    Patient: Rasta Brewster    Procedure(s) Performed: Procedure(s) (LRB):  REPLACEMENT-HIP-TOTAL WITH NAVIGATION revision. aleena. posterior.  (Left)    Anesthesia type: general    Post pain: Adequate analgesia    Post assessment: no apparent anesthetic complications    Last Vitals:   Visit Vitals    /75    Pulse (!) 59    Temp 36.7 °C (98 °F) (Oral)    Resp (!) 22    Ht 6' 4" (1.93 m)    Wt 101.6 kg (224 lb)    SpO2 96%    BMI 27.27 kg/m2       Post vital signs: stable    Level of consciousness: awake, alert  and oriented    Nausea/Vomiting: no nausea/no vomiting    Complications: none    Airway Patency: patent    Respiratory: unassisted, spontaneous ventilation, room air    Cardiovascular: stable and blood pressure at baseline    Hydration: euvolemic  "

## 2017-02-17 NOTE — IP AVS SNAPSHOT
[facility]  [address]  [facility] [address]  Phone: [phone]           Patient Discharge Instructions     Our goal is to set you up for success. This packet includes information on your condition, medications, and your home care. It will help you to care for yourself so you don't get sicker and need to go back to the hospital.     Please ask your nurse if you have any questions.        There are many details to remember when preparing to leave the hospital. Here is what you will need to do:    1. Take your medicine. If you are prescribed medications, review your Medication List in the following pages. You may have new medications to  at the pharmacy and others that you'll need to stop taking. Review the instructions for how and when to take your medications. Talk with your doctor or nurses if you are unsure of what to do.     2. Go to your follow-up appointments. Specific follow-up information is listed in the following pages. Your may be contacted by a transition nurse or clinical provider about future appointments. Be sure we have all of the phone numbers to reach you, if needed. Please contact your provider's office if you are unable to make an appointment.     3. Watch for warning signs. Your doctor or nurse will give you detailed warning signs to watch for and when to call for assistance. These instructions may also include educational information about your condition. If you experience any of warning signs to your health, call your doctor.               [facility]  Unless otherwise directed by your provider, please contact [facility], our nurse care line that is available for 24/7 assistance.     [phone] (toll-free)    Registered nurses in the [facility] provide clinical advisement, health education, appointment booking, and other advisory services.                    ** Verify the list of medication(s) below is accurate and up  to date. Carry this with you in case of emergency. If your medications have changed, please notify your healthcare provider.             Medication List      START taking these medications        Additional Info                      docusate sodium 100 MG capsule   Commonly known as:  COLACE   Quantity:  60 capsule   Refills:  1   Dose:  100 mg    Instructions:  Take 1 capsule (100 mg total) by mouth 2 (two) times daily as needed for Constipation.     Begin Date    AM    Noon    PM    Bedtime       ondansetron 8 MG tablet   Commonly known as:  ZOFRAN   Quantity:  60 tablet   Refills:  0   Dose:  8 mg    Instructions:  Take 1 tablet (8 mg total) by mouth every 12 (twelve) hours as needed for Nausea.     Begin Date    AM    Noon    PM    Bedtime       oxycodone-acetaminophen  mg per tablet   Commonly known as:  PERCOCET   Quantity:  90 tablet   Refills:  0   Dose:  1 tablet    Instructions:  Take 1 tablet by mouth every 4 (four) hours as needed for Pain.     Begin Date    AM    Noon    PM    Bedtime         CHANGE how you take these medications        Additional Info                      * aspirin 81 MG EC tablet   Commonly known as:  ECOTRIN   Refills:  0   Dose:  81 mg   What changed:  Another medication with the same name was added. Make sure you understand how and when to take each.    Instructions:  Take 81 mg by mouth every morning.     Begin Date    AM    Noon    PM    Bedtime       * aspirin 325 MG tablet   Quantity:  70 tablet   Refills:  0   Dose:  325 mg   What changed:  You were already taking a medication with the same name, and this prescription was added. Make sure you understand how and when to take each.    Last time this was given:  325 mg on 2/19/2017  8:39 AM   Instructions:  Take 1 tablet (325 mg total) by mouth 2 (two) times daily.     Begin Date    AM    Noon    PM    Bedtime       * Notice:  This list has 2 medication(s) that are the same as other medications prescribed for you. Read the  directions carefully, and ask your doctor or other care provider to review them with you.      CONTINUE taking these medications        Additional Info                      ADVIL 200 MG tablet   Refills:  0   Dose:  800 mg   Generic drug:  ibuprofen    Instructions:  Take 800 mg by mouth daily as needed for Pain.     Begin Date    AM    Noon    PM    Bedtime       * eszopiclone 3 mg Tab   Refills:  0   Dose:  3 mg    Instructions:  3 mg every evening.     Begin Date    AM    Noon    PM    Bedtime       * eszopiclone 1 MG Tab   Commonly known as:  LUNESTA   Refills:  0   Dose:  3 mg   Indications:  Insomnia    Instructions:  3 mg nightly as needed.     Begin Date    AM    Noon    PM    Bedtime       losartan 50 MG tablet   Commonly known as:  COZAAR   Refills:  0   Dose:  50 mg   Indications:  Hypertension    Last time this was given:  50 mg on 2/19/2017  5:40 AM   Instructions:  Take 50 mg by mouth every morning.     Begin Date    AM    Noon    PM    Bedtime       TRINTELLIX 20 mg Tab   Refills:  0   Generic drug:  vortioxetine    Instructions:  every morning.     Begin Date    AM    Noon    PM    Bedtime       VITAMIN D2 ORAL   Refills:  0   Dose:  1000 Units    Instructions:  Take 1,000 Units by mouth every morning.     Begin Date    AM    Noon    PM    Bedtime       * Notice:  This list has 2 medication(s) that are the same as other medications prescribed for you. Read the directions carefully, and ask your doctor or other care provider to review them with you.         Where to Get Your Medications      You can get these medications from any pharmacy     Bring a paper prescription for each of these medications     aspirin 325 MG tablet    docusate sodium 100 MG capsule    ondansetron 8 MG tablet    oxycodone-acetaminophen  mg per tablet                  Please bring to all follow up appointments:    1. A copy of your discharge instructions.  2. All medicines you are currently taking in their original  "bottles.  3. Identification and insurance card.    Please arrive 15 minutes ahead of scheduled appointment time.    Please call 24 hours in advance if you must reschedule your appointment and/or time.        Follow-up Information     Follow up with Alec Montalvo MD In 2 weeks.    Specialty:  Orthopedic Surgery    Contact information:    [address]  [facility] [address]  [phone]          Discharge Instructions     Future Orders    Activity as tolerated     Call MD for:  difficulty breathing or increased cough     Call MD for:  increased confusion or weakness     Call MD for:  persistent dizziness, light-headedness, or visual disturbances     Call MD for:  persistent nausea and vomiting or diarrhea     Call MD for:  redness, tenderness, or signs of infection (pain, swelling, redness, odor or green/yellow discharge around incision site)     Call MD for:  severe persistent headache     Call MD for:  severe uncontrolled pain     Call MD for:  temperature >100.4     Call MD for:  worsening rash     COMMODE FOR HOME USE     Questions:    Type:  Standard    Height:  6' 4" (1.93 m)    Weight:  101.6 kg (224 lb)    Does patient have medical equipment at home?:      Length of need (1-99 months):  99    Diet general     Questions:    Total calories:      Fat restriction, if any:      Protein restriction, if any:      Na restriction, if any:      Fluid restriction:      Additional restrictions:      Leave dressing on - Keep it clean, dry, and intact until clinic visit     No dressing needed     TRANSFER TUB BENCH FOR HOME USE     Questions:    Type of Transfer Tub Bench:  Padded    Height:  6' 4" (1.93 m)    Weight:  101.6 kg (224 lb)    Does patient have medical equipment at home?:      Length of need (1-99 months):  99    WALKER FOR HOME USE     Questions:    Type of Walker:  Adult (5'4"-6'6")    With wheels?:  No    Height:  6' 4" (1.93 m)    Weight:  101.6 kg (224 lb)    Length of need (1-99 months):  99 " "   Platform attachment:      Accessories/Other:      Assistance needed:      Does patient have medical equipment at home?:      Vendor:  Other (use comments) Comment - Disregard    Expected Date of Delivery:  2/17/2017    Expected Time of Delivery:      WALKER FOR HOME USE     Questions:    Type of Walker:  Adult (5'4"-6'6")    With wheels?:  No    Height:  6' 4" (1.93 m)    Weight:  101.6 kg (224 lb)    Length of need (1-99 months):  99    Platform attachment:      Accessories/Other:      Assistance needed:      Does patient have medical equipment at home?:          Primary Diagnosis     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      Admission Information     Date & Time Provider Department CSN    2/17/2017  8:22 AM Alec Montalvo MD [facility] [id number]      Care Providers     Provider Role Specialty Primary office phone    Alec Montalvo MD Attending Provider Orthopedic Surgery [phone]    Alec Montalvo MD Surgeon  Orthopedic Surgery [phone]      Your Vitals Were     BP Pulse Temp Resp Height Weight    119/65 (BP Location: Right arm, Patient Position: Lying, BP Method: Automatic) 62 98.3 °F (36.8 °C) (Oral) 14 6' 4" (1.93 m) 101.6 kg (224 lb)    SpO2 BMI             95% 27.27 kg/m2         Recent Lab Values     No lab values to display.      Pending Labs     Order Current Status    Prepare RBC 2 Units; Operative Procedure In process    Specimen to Pathology - Surgery In process    Prepare RBC 2 Units; REPLACEMENT HIP Preliminary result      Allergies as of 2/19/2017     No Known Allergies      Advance Directives     An advance directive is a document which, in the event you are no longer able to make decisions for yourself, tells your healthcare team what kind of treatment you do or do not want to receive, or who you would like to make those decisions for you.  If you do not currently have an advance directive, [facility] encourages you to create " one.  For more information call:  [phone]), [phone] ([phone]),  or log on to [email or web address].        Language Assistance Services     ATTENTION: Language assistance services are available, free of charge. Please call [phone].      ATENCIÓN: Si habla español, tiene a austin disposición servicios gratuitos de asistencia lingüística. Llame al [phone].     [facility] Ý: N?u b?n nói Ti?ng Vi?t, có các d?ch v? h? tr? ngôn ng? mi?n phí dành cho b?n. G?i s? [phone].        [facility] Sign-Up     Activating your [facility] account is as easy as 1-2-3!     1) Visit [email or web address], select Sign Up Now, enter this activation code and your date of birth, then select Next.  [email or web address]  Expires: 3/30/2017  9:24 AM      2) Create a username and password to use when you visit [facility] in the future and select a security question in case you lose your password and select Next.    3) Enter your e-mail address and click Sign Up!    Additional Information  If you have questions, please e-mail [email or web address] or call [phone] to talk to our [facility] staff. Remember, [facility] is NOT to be used for urgent needs. For medical emergencies, dial 911.          [facility] complies with applicable Federal civil rights laws and does not discriminate on the basis of race, color, national origin, age, disability, or sex.

## 2017-02-17 NOTE — PROGRESS NOTES
Patient has posterior pain behind buttocks, dr lea at bedside assessing pain, no new orders, will continue to monitor.

## 2017-02-18 LAB
HCT VFR BLD AUTO: 31.4 %
HGB BLD-MCNC: 11 G/DL

## 2017-02-18 PROCEDURE — 97110 THERAPEUTIC EXERCISES: CPT

## 2017-02-18 PROCEDURE — 11000001 HC ACUTE MED/SURG PRIVATE ROOM

## 2017-02-18 PROCEDURE — 63600175 PHARM REV CODE 636 W HCPCS: Performed by: STUDENT IN AN ORGANIZED HEALTH CARE EDUCATION/TRAINING PROGRAM

## 2017-02-18 PROCEDURE — 97530 THERAPEUTIC ACTIVITIES: CPT

## 2017-02-18 PROCEDURE — 85018 HEMOGLOBIN: CPT

## 2017-02-18 PROCEDURE — 97166 OT EVAL MOD COMPLEX 45 MIN: CPT

## 2017-02-18 PROCEDURE — 85014 HEMATOCRIT: CPT

## 2017-02-18 PROCEDURE — 97162 PT EVAL MOD COMPLEX 30 MIN: CPT

## 2017-02-18 PROCEDURE — 97116 GAIT TRAINING THERAPY: CPT

## 2017-02-18 PROCEDURE — 25000003 PHARM REV CODE 250: Performed by: STUDENT IN AN ORGANIZED HEALTH CARE EDUCATION/TRAINING PROGRAM

## 2017-02-18 PROCEDURE — 36415 COLL VENOUS BLD VENIPUNCTURE: CPT

## 2017-02-18 RX ADMIN — Medication 3 ML: at 06:02

## 2017-02-18 RX ADMIN — ACETAMINOPHEN 1000 MG: 500 TABLET ORAL at 11:02

## 2017-02-18 RX ADMIN — ACETAMINOPHEN 1000 MG: 500 TABLET ORAL at 08:02

## 2017-02-18 RX ADMIN — ASPIRIN 325 MG ORAL TABLET 325 MG: 325 PILL ORAL at 08:02

## 2017-02-18 RX ADMIN — DIPHENHYDRAMINE HYDROCHLORIDE 25 MG: 25 CAPSULE ORAL at 01:02

## 2017-02-18 RX ADMIN — STANDARDIZED SENNA CONCENTRATE AND DOCUSATE SODIUM 1 TABLET: 8.6; 5 TABLET, FILM COATED ORAL at 08:02

## 2017-02-18 RX ADMIN — ACETAMINOPHEN 1000 MG: 500 TABLET ORAL at 05:02

## 2017-02-18 RX ADMIN — CEFAZOLIN SODIUM 2 G: 2 SOLUTION INTRAVENOUS at 07:02

## 2017-02-18 RX ADMIN — Medication 3 ML: at 11:02

## 2017-02-18 RX ADMIN — OXYCODONE HYDROCHLORIDE 10 MG: 5 TABLET ORAL at 03:02

## 2017-02-18 RX ADMIN — FAMOTIDINE 20 MG: 20 TABLET, FILM COATED ORAL at 08:02

## 2017-02-18 RX ADMIN — OXYCODONE HYDROCHLORIDE 10 MG: 5 TABLET ORAL at 06:02

## 2017-02-18 RX ADMIN — VANCOMYCIN HYDROCHLORIDE 1000 MG: 1 INJECTION, POWDER, LYOPHILIZED, FOR SOLUTION INTRAVENOUS at 01:02

## 2017-02-18 RX ADMIN — LOSARTAN POTASSIUM 50 MG: 25 TABLET, FILM COATED ORAL at 06:02

## 2017-02-18 RX ADMIN — PREGABALIN 75 MG: 75 CAPSULE ORAL at 08:02

## 2017-02-18 RX ADMIN — OXYCODONE HYDROCHLORIDE 10 MG: 5 TABLET ORAL at 08:02

## 2017-02-18 RX ADMIN — Medication 3 ML: at 10:02

## 2017-02-18 RX ADMIN — CELECOXIB 200 MG: 200 CAPSULE ORAL at 08:02

## 2017-02-18 RX ADMIN — OXYCODONE HYDROCHLORIDE 10 MG: 5 TABLET ORAL at 11:02

## 2017-02-18 NOTE — PLAN OF CARE
Problem: Patient Care Overview  Goal: Plan of Care Review  Outcome: Ongoing (interventions implemented as appropriate)  Pt is progressing with plan of care. Frequent rounds made to assess pain and safety. Pt's pain controlled with pain medication ordered at this time. Bed locked and at lowest position. Side rails up x 2. Call light within reach. Will continue to monitor.

## 2017-02-18 NOTE — NURSING TRANSFER
Nursing Transfer Note      2/17/2017     Transfer To: [id number]    Transfer via stretcher    Transfer with n/a    Transported by pct    Medicines sent: pcea    Chart send with patient: Yes    Notified: spouse

## 2017-02-18 NOTE — PT/OT/SLP EVAL
"Physical Therapy   Evaluation/Treatment    Rasta Brewster   MRN: [id number]     PT Received On: 17  PT Start Time: 35  PT Stop Time: 1015  PT Total Time (min): 40 min    Billable Minutes:  Evaluation 10, Gait [id number] and Therapeutic Exercise 15    Diagnosis: Prosthetic joint implant failure    Past Medical History   Diagnosis Date    Anxiety     Arthritis     Cancer      melanoma -operated thrice    Hypertension      Past Surgical History   Procedure Laterality Date    Back surgery      Elbow surgery      Foot surgery      Hip surgery      Joint replacement      Knee arthroscopy      Shoulder arthroscopy      Tonsillectomy      Vasectomy         Referring physician: Selvin  Date referred to PT: 2017     General Precautions: Standard, fall  Orthopedic Precautions : LLE weight bearing as tolerated, LLE posterior precautions  Braces: N/A    Do you have any cultural, spiritual, Presybeterian conflicts, given your current situation?: none    Patient History:  Lives With: spouse  Living Arrangements: house  Living Environment Comment: 24 hour assist/supervision.  No concerns at home   Equipment Currently Used at Home: walker, rolling, bedside commode    Previous Level of Function:  Ambulation Skills: needs device  Transfer Skills: independent  ADL Skills: independent    Subjective:  Communicated with RN prior to session.    "I had an anterior approach before this"    Chief Complaint: none stated  Patient goals: to return home    Pain Ratin/10 in L hip  Pain Rating Post-Intervention: 2/10    Objective:  Patient found supine in bed     Patient found with: peripheral IV, perineural catheter    Cognitive Exam:  Oriented to: Person, Place, Time and Situation  Follows Commands/attention: Follows multistep  commands  Communication: clear/fluent  Safety awareness/insight to disability: intact    Physical Exam:  Postural examination/scapula alignment: No postural abnormalities identified    Skin " integrity: Visible skin intact  Edema: None noted     Sensation:   Intact    Lower Extremity Range of Motion:  Right Lower Extremity: WFL  Left Lower Extremity: WFL    Lower Extremity Strength:  Right Lower Extremity: WFL  Left Lower Extremity: WFL    Functional Mobility:    Bed Mobility:    Supine to Sit: Supervision  Sit to Supine: Supervision    Transfers:    Sit <> Stand Assistance: Supervision  Sit <> Stand Assistive Device: Rolling Walker    Ambulation:    Gait Distance: ~90 feet with no LOB or SOB  Assistance 1: Stand by Assistance  Gait Assistive Device: Rolling walker  Gait Pattern: reciprocal  Gait Deviation(s): decreased rebel, decreased step length, decreased stride length    Balance:   Static Sit: GOOD: Takes MODERATE challenges from all directions  Dynamic Sit:  GOOD: Maintains balance through MODERATE excursions of active trunk movement  Static Stand: GOOD: Takes MODERATE challenges from all directions  Dynamic stand: FAIR+: Needs CLOSE SUPERVISION during gait and is able to right self with minor LOB    Therapeutic Activities and Exercises:  PT evaluation performed.  White board updated.  Pt educated on role of PT, safety with functional mobility.     Pt performed ROCCO protocol x 30 reps.  Pt given HEP and instructed on frequency to perform exercises.  Pt able to recall 3/3 posterior hip precautions at the end of session and given handout.      Patient left up in chair with all lines intact, call button in reach, RN notified and wife present.    AM-PAC 6 CLICK MOBILITY  1 = Unable, Total/Dependent Assistance  2 = A lot, Maximum/Moderate Assistance  3 = A little, Minimum/Contact Guard/Supervision  4 = None, Modified [place]/Independent    Turning over in bed (including adjusting bedclothes, sheets and blankets)?: 4  Sitting down on and standing up from a chair with arms (e.g., wheelchair, bedside commode, etc.): 3  Moving from lying on back to sitting on the side of the bed?: 4  Moving to and  from a bed to a chair (including a wheelchair)?: 3  Need to walk in hospital room?: 3  Climbing 3-5 steps with a railing?: 3  Total Score: 20    AM-PAC Raw Score   CMS G-Code Modifier   Level of Impairment   Assistance     6   CN   100%         Total / Unable   7 - 9   CM   80 - 100%   Maximal Assist     10 - 14   CL   60 - 80%   Moderate Assist     15 - 19   CK   40 - 60%   Moderate Assist     20 - 22   CJ   20 - 40%   Minimal Assist     23   CI   1-20%         SBA / CGA     24 CH   0%   Independent/Modified Independent       Assessment:  Rasta Brewster is a 61 y.o. male with a medical diagnosis of Prosthetic joint implant failure. He presents with deficits listed below.  Pt tolerated evaluation well.  Pt is a good candidate for skilled PT services to address the below deficits and to increase functional independence.      Rehab identified problem list/impairments: weakness, impaired balance, impaired endurance, gait instability, impaired functional mobilty, orthopedic precautions, impaired skin, edema, decreased ROM    Rehab potential is good.    Activity tolerance: Good    Discharge Facility/Level Of Care Needs: home with home health    Barriers to Discharge: None    Equipment Needed After Discharge: none    GOALS:   Physical Therapy Goals        Problem: Physical Therapy Goal    Goal Priority Disciplines Outcome Goal Variances Interventions   Physical Therapy Goal     PT/OT, PT Ongoing (interventions implemented as appropriate)     Description:  Goals to be met by: 17     Patient will increase functional independence with mobility by performin. Supine to sit with Modified [place]  2. Sit to supine with Modified [place]  3. Sit to stand transfer with Modified [place]  4. Gait  x 150 feet with Modified [place] using Rolling Walker.   5. Lower extremity exercise program x 30 reps per handout, with independence                PLAN:    Patient to be seen daily to address the  above listed problems via gait training, therapeutic activities, therapeutic exercises, neuromuscular re-education  Plan of Care Expires: 03/20/17  Plan of Care reviewed with: patient, spouse    Eligio Cole, PT, DPT  2/18/2017   [phone]

## 2017-02-18 NOTE — PROGRESS NOTES
Daily Orthopaedic Progress Note    Rasta Brewster is a 61 y.o. male admitted on 2/17/2017  Hospital Day: 1      The patient was seen and examined this morning at the bedside. Patient reports no acute issues overnight.  Pain well controlled.    PHYSICAL EXAM:  Awake/alert/oriented x3, No acute distress, Afebrile, Vital signs stable  Good inspiratory effort with unlaboured breathing  Dressings c/d/i  NVI in operative limb    Vitals:    02/17/17 2200 02/18/17 0030 02/18/17 0200 02/18/17 0410   BP: 138/68 130/66 122/60 123/65   BP Location: Left arm Left arm Left arm Left arm   Patient Position: Lying Lying Lying Lying   BP Method: Manual Manual Manual Automatic   Pulse: 64 65 (!) 54 60   Resp: 17 19 17 18   Temp: 98.1 °F (36.7 °C) 97.3 °F (36.3 °C) 96.7 °F (35.9 °C) 97.3 °F (36.3 °C)   TempSrc: Oral Oral Oral Oral   SpO2: 97% 95% 95% 95%   Weight:       Height:         I/O last 3 completed shifts:  In: 4544 [I.V.:4544]  Out: 3250 [Urine:2250; Blood:1000]    Recent Labs  Lab 02/17/17  1525   CALCIUM 8.3*      K 4.0   CO2 26      BUN 16   CREATININE 0.9       Recent Labs  Lab 02/17/17  1430 02/17/17  1525   HGB  --  12.3*   HCT 31* 34.8*     No results for input(s): INR, APTT in the last 72 hours.    Invalid input(s): PT    A/P: 61 y.o. male 1 Day Post-Op s/p left revision ROCCO  WB status: wbat with posterior hip precautions  PT/OT  Pain control  Abx: post op  DVT PPx: ASA    Dispo: dc home tomorrow    Eric Anderson M.D. PGY2  Orthopedic Surgery Resident

## 2017-02-18 NOTE — PLAN OF CARE
Problem: Patient Care Overview  Goal: Plan of Care Review  Outcome: Ongoing (interventions implemented as appropriate)   Pt AAOx3. No falls noted, fall precautions in place. No skin breakdown noted. Pt encouraged to eat and drink.No pain noted. VSS. Will continue to monitor.

## 2017-02-18 NOTE — PROGRESS NOTES
Pt still has his epidural except it has been turned off since 9 am. I  spoke to ortho and anesthesia to inquire about removal earlier. Paging md on call to inquire.

## 2017-02-18 NOTE — PROGRESS NOTES
Paged anesthesia about pt's pcea which is still running and not turned off like most epidural patients at this time. Was advised by Anesthesia that patient is a hip revision and that primary team is managing this. Paged Md on call for Selvin. Was advised to turn off pcea. Pcea turned off and was advised that pcea will be removed at some point. Will continue to monitor.

## 2017-02-18 NOTE — PROGRESS NOTES
Spoke with anesthesia who says the will remove epidural with ortho's ok. Spoke with ortho who says its ok for them to take out. Anesthesia says they will come remove in near future.

## 2017-02-18 NOTE — PLAN OF CARE
Problem: Physical Therapy Goal  Goal: Physical Therapy Goal  Goals to be met by: 17     Patient will increase functional independence with mobility by performin. Supine to sit with Modified [place]  2. Sit to supine with Modified [place]  3. Sit to stand transfer with Modified [place]  4. Gait x 150 feet with Modified [place] using Rolling Walker.   5. Lower extremity exercise program x 30 reps per handout, with independence  Outcome: Ongoing (interventions implemented as appropriate)  PT eval complete and POC initiated.

## 2017-02-19 VITALS
TEMPERATURE: 97 F | BODY MASS INDEX: 27.28 KG/M2 | HEART RATE: 69 BPM | HEIGHT: 76 IN | RESPIRATION RATE: 16 BRPM | DIASTOLIC BLOOD PRESSURE: 71 MMHG | SYSTOLIC BLOOD PRESSURE: 118 MMHG | WEIGHT: 224 LBS | OXYGEN SATURATION: 98 %

## 2017-02-19 LAB
HCT VFR BLD AUTO: 31.1 %
HGB BLD-MCNC: 10.6 G/DL

## 2017-02-19 PROCEDURE — 25000003 PHARM REV CODE 250: Performed by: STUDENT IN AN ORGANIZED HEALTH CARE EDUCATION/TRAINING PROGRAM

## 2017-02-19 PROCEDURE — 85018 HEMOGLOBIN: CPT

## 2017-02-19 PROCEDURE — 36415 COLL VENOUS BLD VENIPUNCTURE: CPT

## 2017-02-19 PROCEDURE — 97116 GAIT TRAINING THERAPY: CPT

## 2017-02-19 PROCEDURE — 97530 THERAPEUTIC ACTIVITIES: CPT

## 2017-02-19 PROCEDURE — 97535 SELF CARE MNGMENT TRAINING: CPT

## 2017-02-19 PROCEDURE — 85014 HEMATOCRIT: CPT

## 2017-02-19 RX ADMIN — OXYCODONE HYDROCHLORIDE 10 MG: 5 TABLET ORAL at 12:02

## 2017-02-19 RX ADMIN — FAMOTIDINE 20 MG: 20 TABLET, FILM COATED ORAL at 08:02

## 2017-02-19 RX ADMIN — ASPIRIN 325 MG ORAL TABLET 325 MG: 325 PILL ORAL at 08:02

## 2017-02-19 RX ADMIN — LOSARTAN POTASSIUM 50 MG: 25 TABLET, FILM COATED ORAL at 05:02

## 2017-02-19 RX ADMIN — ACETAMINOPHEN 1000 MG: 500 TABLET ORAL at 05:02

## 2017-02-19 RX ADMIN — OXYCODONE HYDROCHLORIDE 10 MG: 5 TABLET ORAL at 09:02

## 2017-02-19 RX ADMIN — Medication 3 ML: at 05:02

## 2017-02-19 RX ADMIN — STANDARDIZED SENNA CONCENTRATE AND DOCUSATE SODIUM 1 TABLET: 8.6; 5 TABLET, FILM COATED ORAL at 08:02

## 2017-02-19 RX ADMIN — CELECOXIB 200 MG: 200 CAPSULE ORAL at 08:02

## 2017-02-19 RX ADMIN — OXYCODONE HYDROCHLORIDE 10 MG: 5 TABLET ORAL at 05:02

## 2017-02-19 NOTE — PROGRESS NOTES
Pt discharged to home via wheelchair. Pt denies denies pain at the present time. Condition stable. Follows commands. Pt given prescriptions and copy of discharge home care instructions. Pt verbalized understanding of activity limitations and s/s of when to call the dr. Pt also given information on followup appointment. All questions answered. All belongings with the patient. Pt verbalized understanding of all discharge instructions.

## 2017-02-19 NOTE — PLAN OF CARE
Problem: Physical Therapy Goal  Goal: Physical Therapy Goal  Goals to be met by: 17     Patient will increase functional independence with mobility by performin. Supine to sit with Modified [place]  2. Sit to supine with Modified [place]  3. Sit to stand transfer with Modified [place]  4. Gait x 150 feet with Modified [place] using Rolling Walker.   5. Lower extremity exercise program x 30 reps per handout, with independence   Patient goals remain appropriate.  Patient will continue to benefit from further PT services.  Isaias Howard, PTA

## 2017-02-19 NOTE — PLAN OF CARE
Pt being discharged home today and will need BSC, spoke with ODME, faxed orders and paperwork to [phone].  Also, pt will need home health nursing care in [place].  Pt gave CM [facility], [phone], was asked to leave message.   Will fax orders and referral to [phone].  Pt will be returning to [place] tomorrow. Pt being discharged home today, and will stay in a hotel overnight.  Pt does have transportation at discharge and after BSC arrives, he will be ready for discharge.     CM also, provided pt with  orders and confirmation of fax, since pt will be going back to [place].    Did receive call back and they will see patient when he gets to [place]

## 2017-02-19 NOTE — PLAN OF CARE
Problem: Patient Care Overview  Goal: Plan of Care Review  Outcome: Ongoing (interventions implemented as appropriate)  All systems assessed. No falls or injuries this shift. Patient sitting up in chair visiting with wife. Wound to left hip with dressing dry/intact.Patient ambulates without difficulty. Will continue to monitor patient.

## 2017-02-19 NOTE — PROGRESS NOTES
Daily Orthopaedic Progress Note    Rasta Brewster is a 61 y.o. male admitted on 2/17/2017  Hospital Day: 2      The patient was seen and examined this morning at the bedside. Patient reports no acute issues overnight.  Pain well controlled.  Worked with PT yesterday, ambulated 90 ft and tolerated well    PHYSICAL EXAM:  Awake/alert/oriented x3, No acute distress, Afebrile, Vital signs stable  Good inspiratory effort with unlaboured breathing  Dressings c/d/i  NVI in operative limb    Vitals:    02/18/17 1500 02/18/17 1948 02/19/17 0050 02/19/17 0500   BP: (!) 123/59 (!) 120/59 118/63 125/71   BP Location: Right leg Right leg  Right arm   Patient Position: Lying Lying  Lying   BP Method: Automatic Automatic  Automatic   Pulse: 69 76 67 (!) 59   Resp: 18 20 20 20   Temp: 98.3 °F (36.8 °C) 98.9 °F (37.2 °C) 96.8 °F (36 °C) 98.4 °F (36.9 °C)   TempSrc: Oral Oral  Oral   SpO2: 96% (!) 94% 95% 95%   Weight:       Height:         I/O last 3 completed shifts:  In: 6564 [P.O.:1120; I.V.:5444]  Out: 4200 [Urine:3200; Blood:1000]    Recent Labs  Lab 02/17/17  1525   CALCIUM 8.3*      K 4.0   CO2 26      BUN 16   CREATININE 0.9       Recent Labs  Lab 02/17/17  1525 02/18/17  0731 02/19/17  0407   HGB 12.3* 11.0* 10.6*   HCT 34.8* 31.4* 31.1*     No results for input(s): INR, APTT in the last 72 hours.    Invalid input(s): PT    A/P: 61 y.o. male 2 Day Post-Op s/p left revision ROCCO  WB status: wbat with posterior hip precautions  PT/OT  Pain control  DVT PPx: ASA    Dispo: dc home today    Eric Anderson M.D. PGY2  Orthopedic Surgery Resident

## 2017-02-19 NOTE — PT/OT/SLP PROGRESS
Physical Therapy  Treatment    Rasta Brewster   MRN: [id number]   Admitting Diagnosis: Prosthetic joint implant failure    PT Received On: 17  PT Start Time: 741     PT Stop Time: 836    PT Total Time (min): 55 min       Billable Minutes:  Gait [id number], Therapeutic Activity 25 and Therapeutic Exercise 5    Treatment Type: Treatment  PT/PTA: PTA     PTA Visit Number: 1       General Precautions: Standard, fall  Orthopedic Precautions: LLE weight bearing as tolerated, LLE posterior precautions   Braces:      Do you have any cultural, spiritual, Latter day conflicts, given your current situation?: none    Subjective:  Communicated with NSG prior to session.  Patient agreeable to therapy.  I don't have any pain    Pain Ratin/10                   Objective:   Patient found with: FCD    Functional Mobility:  Bed Mobility:   Supine to Sit: Minimum Assistance (with L LE)  Sit to Supine: Supervision (VC's for technique)    Transfers:  Sit <> Stand Assistance: Supervision (VC's fro L LE placement)  Sit <> Stand Assistive Device: Rolling Walker    Gait:   Gait Distance: 150 feet and 130 feet  Assistance 1: Supervision  Gait Assistive Device: Rolling walker  Gait Pattern: swing-through gait  Gait Deviation(s):  (no LOB, VC's for toe off, increase L knee extension during stance phase and V/T cues for decrease circumduction.)    Stairs:  Ascend and descend 7inch curb step with FWD and BWD approach SBA using RW.  VC's for technique and sequence    Balance:   Static Sit: NORMAL: No deviations seen in posture held statically  Dynamic Sit: NORMAL: No deviations seen in posture held dynamically  Static Stand: GOOD+: Takes MAXIMAL challenges from all directions  Dynamic stand: GOOD: Needs SUPERVISION only during gait and able to self right with moderate      Therapeutic Activities and Exercises:  Patient performed THR protocol exercises x30 reps  Patient given HEP  Patient recalled 3/3 hip precautions  Patient  educated on car transfers  Patient educated on safety with OOB mobility  Patient stated he felt comfortable going home and had no concerns         AM-PAC 6 CLICK MOBILITY  How much help from another person does this patient currently need?   1 = Unable, Total/Dependent Assistance  2 = A lot, Maximum/Moderate Assistance  3 = A little, Minimum/Contact Guard/Supervision  4 = None, Modified [place]/Independent    Turning over in bed (including adjusting bedclothes, sheets and blankets)?: 4  Sitting down on and standing up from a chair with arms (e.g., wheelchair, bedside commode, etc.): 4  Moving from lying on back to sitting on the side of the bed?: 3  Moving to and from a bed to a chair (including a wheelchair)?: 4  Need to walk in hospital room?: 4  Climbing 3-5 steps with a railing?: 4  Total Score: 23    AM-PAC Raw Score CMS G-Code Modifier Level of Impairment Assistance   6 % Total / Unable   7 - 9 CM 80 - 100% Maximal Assist   10 - 14 CL 60 - 80% Moderate Assist   15 - 19 CK 40 - 60% Moderate Assist   20 - 22 CJ 20 - 40% Minimal Assist   23 CI 1-20% SBA / CGA   24 CH 0% Independent/ Mod I     Patient left up in chair with all lines intact and call button in reach.    Assessment:  Rasta Brewster is a 61 y.o. male with a medical diagnosis of Prosthetic joint implant failure and presents with decrease strength, mobility and balance. Tolerated increase distance with gait training without difficulty.  Patient demonstrated good balance and L knee stability with OOB mobility using RW.  Patient will require family assistance upon discharge from hospital.     Rehab identified problem list/impairments: Rehab identified problem list/impairments: weakness, gait instability, impaired balance, orthopedic precautions    Rehab potential is good.    Activity tolerance: Good    Discharge recommendations: Discharge Facility/Level Of Care Needs: home health PT     Barriers to discharge: Barriers to Discharge:  None    Equipment recommendations: Equipment Needed After Discharge: bedside commode     GOALS:   Physical Therapy Goals        Problem: Physical Therapy Goal    Goal Priority Disciplines Outcome Goal Variances Interventions   Physical Therapy Goal     PT/OT, PT      Description:  Goals to be met by: 17     Patient will increase functional independence with mobility by performin. Supine to sit with Modified [place]  2. Sit to supine with Modified [place]  3. Sit to stand transfer with Modified [place]  4. Gait  x 150 feet with Modified [place] using Rolling Walker.   5. Lower extremity exercise program x 30 reps per handout, with independence                 PLAN:    Patient to be seen daily  to address the above listed problems via gait training, therapeutic activities, therapeutic exercises, neuromuscular re-education  Plan of Care expires: 17  Plan of Care reviewed with: patient         Isaias Howard II, PTA  2017

## 2017-02-19 NOTE — DISCHARGE SUMMARY
[facility]  Discharge Summary      Admit Date: 2/17/2017    Discharge Date and Time: 2/19/2017 12:03 PM    Attending Physician: Alec Montalvo MD     Reason for Admission: left ROCCO    Procedures Performed: Procedure(s) (LRB):  REPLACEMENT-HIP-TOTAL WITH NAVIGATION revision. aleena. posterior.  (Left)    Hospital Course (synopsis of major diagnoses, care, treatment, and services provided during the course of the hospital stay): Hospital Course:  On 2/17/2017, the patient arrived to the [facility] on the second floor of [facility] for proper pre-operative management.  Upon completion of pre-operative preparation, the patient was taken back to the operative theatre.  A left ROCCO was performed without complication and the patient was transported to the post anesthesia care unit in stable condition.  After appropriate recovery from the anaesthetic agents used during the surgery, the patient was then transported to the hospital inpatient floor.  The interim of the hospital stay from arrival on the floor up to discharge has been uncomplicated. The patient has experienced minimal electrolyte imbalances that have been repleated accordingly.  The patient's diet has progressed to a regular diet with no nausea or vomiting.  The patient has transitioned from a perineural anesthesia unit and IV medication to an oral pain regimen and is currently pleased with the pain control on this regimen.  The patient had a hamilton catheter placed intraoperatively.  This hamilton was discontinued on post-operative day one and the patient has been voiding without difficulty ever since.  The patient began participation in physical therapy on post-operative day one and has progressed throughout the entire hospital stay.  Currently the patient is ambulating with moderate assistance and the physical therapy team feels that the patient's progress is sufficient to allow the patient to be discharged home  safely.  The patient agrees with this assessment and desires a discharge to home today.        Consults: PT and OT    Significant Diagnostic Studies:     Final Diagnoses:    Principal Problem: Prosthetic joint implant failure   Secondary Diagnoses: same    Discharged Condition: stable    Disposition: Home or Self Care    Follow Up/Patient Instructions:     Medications:  Reconciled Home Medications:   Discharge Medication List as of 2/19/2017 11:14 AM      START taking these medications    Details   aspirin 325 MG tablet Take 1 tablet (325 mg total) by mouth 2 (two) times daily., Starting 2/17/2017, Until Fri 3/24/17, Print      docusate sodium (COLACE) 100 MG capsule Take 1 capsule (100 mg total) by mouth 2 (two) times daily as needed for Constipation., Starting 2/17/2017, Until Discontinued, Print      ondansetron (ZOFRAN) 8 MG tablet Take 1 tablet (8 mg total) by mouth every 12 (twelve) hours as needed for Nausea., Starting 2/17/2017, Until Discontinued, Print      oxycodone-acetaminophen (PERCOCET)  mg per tablet Take 1 tablet by mouth every 4 (four) hours as needed for Pain., Starting 2/17/2017, Until Discontinued, Print         CONTINUE these medications which have NOT CHANGED    Details   aspirin (ECOTRIN) 81 MG EC tablet Take 81 mg by mouth every morning. , Until Discontinued, Historical Med      !! eszopiclone (LUNESTA) 1 MG Tab 3 mg nightly as needed. , Starting 2/3/2017, Until Discontinued, Historical Med      !! eszopiclone 3 mg Tab 3 mg every evening. , Starting 1/30/2017, Until Discontinued, Historical Med      losartan (COZAAR) 50 MG tablet Take 50 mg by mouth every morning. , Until Discontinued, Historical Med      TRINTELLIX 20 mg Tab every morning. , Starting 1/26/2017, Until Discontinued, Historical Med      ERGOCALCIFEROL, VITAMIN D2, (VITAMIN D2 ORAL) Take 1,000 Units by mouth every morning., Until Discontinued, Historical Med      ibuprofen (ADVIL) 200 MG tablet Take 800 mg by mouth daily  "as needed for Pain. , Until Discontinued, Historical Med       !! - Potential duplicate medications found. Please discuss with provider.          Discharge Procedure Orders  COMMODE FOR HOME USE   Order Specific Question Answer Comments   Type: Standard    Height: 6' 4" (1.93 m)    Weight: 101.6 kg (224 lb)    Length of need (1-99 months): 99      TRANSFER TUB BENCH FOR HOME USE   Order Specific Question Answer Comments   Type of Transfer Tub Bench: Padded    Height: 6' 4" (1.93 m)    Weight: 101.6 kg (224 lb)    Length of need (1-99 months): 99      WALKER FOR HOME USE   Order Specific Question Answer Comments   Type of Walker: Adult (5'4"-6'6")    With wheels? No    Height: 6' 4" (1.93 m)    Weight: 101.6 kg (224 lb)    Length of need (1-99 months): 99    Vendor: Other (use comments) Disregard   Expected Date of Delivery: 2/17/2017      WALKER FOR HOME USE   Order Specific Question Answer Comments   Type of Walker: Adult (5'4"-6'6")    With wheels? No    Height: 6' 4" (1.93 m)    Weight: 101.6 kg (224 lb)    Length of need (1-99 months): 99      Diet general     Activity as tolerated     Keep surgical extremity elevated     Ice to affected area     Call MD for:  temperature >100.4     Call MD for:  persistent nausea and vomiting or diarrhea     Call MD for:  severe uncontrolled pain     Call MD for:  redness, tenderness, or signs of infection (pain, swelling, redness, odor or green/yellow discharge around incision site)     Call MD for:  difficulty breathing or increased cough     Call MD for:  severe persistent headache     Call MD for:  worsening rash     Call MD for:  persistent dizziness, light-headedness, or visual disturbances     Call MD for:  increased confusion or weakness     No dressing needed     Leave dressing on - Keep it clean, dry, and intact until clinic visit       Follow-up Information     Follow up with Alec Montalvo MD In 2 weeks.    Specialty:  Orthopedic Surgery    Contact information: "    [address]  [facility] [address]  [phone]

## 2017-02-20 ENCOUNTER — TELEPHONE (OUTPATIENT)
Dept: ORTHOPEDICS | Facility: CLINIC | Age: 62
End: 2017-02-20

## 2017-02-20 NOTE — OP NOTE
Op Note    Rasta Brewster  [id number]  2/17/2017        Preoperative Diagnosis: Left hip pain [M25.552]  Prosthetic joint implant failure [T84.019A]  Prosthetic joint implant failure [T84.019A]  Postoperative Diagnosis: same    Procedure Performed:   Procedure(s) (LRB):  Revision both components left total hip    Surgeons:  Surgeon(s) and Role:     * Alec Montalvo MD - Primary     * Tariq Greene MD - Resident - Assisting    Anesthesia Staff:  Anesthesiologist: Lc Lake MD  CRNA: Kenia Posadas CRNA    Anesthesia Type: neuraxial    Specimens:  explants    Estimated Blood Loss:  500 mL    Implants:    Implant Name Type Inv. Item Serial No.  Lot No. LRB No. Used   SCREW GAP PLATE 6.5X35MM - [id number]  SCREW GAP PLATE 6.5X35MM  [employer]. [id number] Left 1   SCREW BONE GAP PLATE 50MM - [id number]  SCREW BONE GAP PLATE 50MM  [employer]. [id number] Left 1   Trident X3 0' Polyethylene Insert    PAULO [id number] Left 1   SCREW BONE 40MM - [id number]  SCREW BONE 40MM  [employer]. [id number] Left 1   SCREW BONE 30MM - [id number]  SCREW BONE 30MM  [employer]. [id number] Left 1   STEM HIP SECURFIT SZ 10 127DEG - [id number]  STEM HIP SECURFIT SZ 10 127DEG  [employer]. [id number] Left 1   HEAD FEM V40 36MM +2.5MM BIO - [id number]   HEAD FEM V40 36MM +2.5MM BIO   [employer]. [id number] Left 1       Clinical History:  The patient is a 61 y.o. year-old male with previous anterior total hip replacement on the left side.  He developed subsequent subsidence and significant pain of his left hip and saw me for consultation.  Infection was ruled out.  Metallosis was also ruled out.  Preoperative x-rays showed the stem to be subsided and the cup to be potentially loose.  A plan for revision of all components was made.  The patient at this time was explained the risks and benefits of the surgery including infection, bleeding, damage to neurovascular structures and  potential for continued pain, continued weakness, worsening pain and worsening weakness, stiffness, stability, limb length discrepancy, fracture, infection requiring resection arthroplasty, the need for revision surgery in the future, blood clots, the risks for anesthetic complications, and even death. The patient understood at this point under the risks and wished to proceed.    Description of procedure:  The patient was seen in the preoperative holding area and the appropriate side lower extremity was marked. The patient was brought to the Operating Room and placed supine on the Operating Room table. A preliminary anesthesia time out was then taken.  The patient was then placed in the lateral decubitus position. The @ORLPPLINK(04770,,ORL)@ anesthesia was initiated. The antibiotics were administered within thirty minutes of incision time. The patient was then placed on the hip table in the lateral decubitus position. At this point, the patients surgical area was prepped and draped in the standard, sterile fashion.  A surgical pause was taken to confirm the correct side, procedure, and name. Next, an approximately 8 inch posterolateral incision was made centered over the greater trochanter.  The incision was carried down to the fascia naeem, which was then incised in line with the incision.  The Charnley retractor was now placed, with moist sponges used to protect the skin. The posterior short external rotators were identified, and the piriformis and conjoined tendons were now detached as close to their insertions as possible, and tagged using #2 Ethibond suture. The quadratus femoris was also identified, and a 0 Vicryl tagging suture was applied in a figure-of-eight fashion.   The capsule was now incised in a trapezoidal fashion, using the electrocautery, taking care to leave the retinacular tissues attached to the femoral neck. Tagging stitches were applied to the capsule.       The prosthetic  hip was then gently  dislocated and lesser trochanter to center distance was measured to be 45 mm. the old head was removed.    Attention first was turned to the femur.  The shoulder was cleaned out using osteotomes and curettes and the Mack extractor was used to easily remove the femoral component.    Scar tissue was removed from around the cup and the appropriate retractors were placed to expose the acetabulum.    Screws used to remove the liner from the acetabulum.  The insertion handle was placed on the acetabular cup and the cup was found to be solidly ingrown.  It was decided at this point to leave the acetabular cup.      Liner was placed onto the clean dry locking mechanisms after 2 screws were then placed in the acetabular cup to ensure stability.    Attention then was turned back to the femur.  Appropriate retractors were placed scar tissue was removed from around the femur and sequential broaching up to a size 10 with a metaphyseal fit and fill stem was carried out.  This gave me an LTC of 55 mm which re-created my trialing.  36+0 head was placed onto the trunnion and reduced.  Range of motion and stability was tested including hyperflexion sleeping position extension and external rotation and flexion and internal rotation to ensure joint stability.  There was good stability the offset had been re-created and leg length re-created and the hip was gently dislocated and the actual size #10 prosthesis was impacted into place.  Trial head was used to confirm the LTC measurement and the actual 36+0 head was impacted onto a clean dry trunnion.  The head was now relocated into the acetabular ensuring there was no debris or soft tissue interposed.  Stability testing once again proved the hip had good stability.      The capsule and short external rotators were now closed by reattaching them through a bony bridge in the greater trochanter. The quadratus femoris was reapproximated using multiple 0 Vicryl figure-of-eight sutures.    The remainder of the wound was closed in layers using Monocryl with Dermabond for skin.  Sterile dressings were applied and the  patient was transferred to the Recovery Room in stable condition. All sponge, needle counts were correct x 2.      Post-operative Plan:  Patient will receive the appropriate physical therapy for mobilization and DVT prophylaxis.        Attestation Statement:  I was present for and performed all critical portions of the procedure.    Alec Montalvo     Date: 2/20/2017  Time: 6:42 AM

## 2017-02-20 NOTE — PT/OT/SLP EVAL
Occupational Therapy  Evaluation    Rasta Brewster   MRN: [id number]   Admitting Diagnosis: Prosthetic joint implant failure    OT Date of Treatment: 17   OT Start Time: 935  OT Stop Time: 955  OT Total Time (min): 20 min    Billable Minutes:  Evaluation 10  Therapeutic Activity 10    Diagnosis: Prosthetic joint implant failure   S/p L ROCCO revision    Past Medical History   Diagnosis Date    Anxiety     Arthritis     Cancer      melanoma -operated thrice    Hypertension       Past Surgical History   Procedure Laterality Date    Back surgery      Elbow surgery      Foot surgery      Hip surgery      Joint replacement      Knee arthroscopy      Shoulder arthroscopy      Tonsillectomy      Vasectomy         General Precautions: Standard, fall  Orthopedic Precautions: LLE weight bearing as tolerated, LLE posterior precautions    Patient History:  Living Environment  Lives With: spouse  Living Arrangements: house  Home Accessibility:  (no concerns)  Living Environment Comment:  Spv/(A) available   Equipment Currently Used at Home: walker, rolling, bedside commode    Prior level of function:   Bed Mobility/Transfers: independent  Grooming: independent  Bathing: independent  Upper Body Dressing: independent  Lower Body Dressing: independent  Toileting: independent    Subjective:  Communicated with RN prior to session.    Pt agreeable to Evaluation    Pain Ratin/10    Objective:  Patient found with: FCD    Upper Extremity Range of Motion:  Right Upper Extremity: WFL  Left Upper Extremity: WFL    Upper Extremity Strength:  Right Upper Extremity: WFL  Left Upper Extremity: WFL    Functional Mobility:  Bed Mobility:  Scooting/Bridging: Stand by Assistance  Supine to Sit: Stand by Assistance    Transfers:  Sit <> Stand Assistance: Stand By Assistance  Sit <> Stand Assistive Device: Rolling Walker    Functional Ambulation: SBA with  feet    Activities of Daily Living:  UE Dressing Level of  "Assistance: Stand by assistance    LE Dressing Level of Assistance: Total assistance    Therapeutic Activities and Exercises:  Pt able to verbalize 3/3 posterior hip precautions and required min VCs to adhere with functional mobility.    Pt and wife educated on components of hip kit and about need for extra long hip kit 2* pt's height. Pt and wife verbalized understanding and ordered AE online.    AM-PAC 6 CLICK ADL  How much help from another person does this patient currently need?  1 = Unable, Total/Dependent Assistance  2 = A lot, Maximum/Moderate Assistance  3 = A little, Minimum/Contact Guard/Supervision  4 = None, Modified [place]/Independent    Putting on and taking off regular lower body clothing? : 2  Bathing (including washing, rinsing, drying)?: 2  Toileting, which includes using toilet, bedpan, or urinal? : 2  Putting on and taking off regular upper body clothing?: 3  Taking care of personal grooming such as brushing teeth?: 3  Eating meals?: 4  Total Score: 16    AM-PAC Raw Score CMS "G-Code Modifier Level of Impairment Assistance   6 % Total / Unable   7 - 9 CM 80 - 100% Maximal Assist   10 - 14 CL 60 - 80% Moderate Assist   15 - 19 CK 40 - 60% Moderate Assist   20 - 22 CJ 20 - 40% Minimal Assist   23 CI 1-20% SBA / CGA   24 CH 0% Independent/ Mod I       Patient left ambulating with PT present    Assessment:  Rasta Breswter is a 61 y.o. male with a medical diagnosis of Prosthetic joint implant failure and presents with decreased function and orthopedic precautins of L LE impeding his ability to perform ADLs and functional mobility and would benefit from OT services to maximize functional (I) and safety.    Rehab identified problem list/impairments: Rehab identified problem list/impairments: weakness, impaired self care skills, impaired functional mobilty, gait instability, impaired balance, decreased lower extremity function, impaired skin, edema, orthopedic precautions    Rehab " potential is good.    Activity tolerance: Good    Discharge recommendations: Discharge Facility/Level Of Care Needs: home with home health     Barriers to discharge: Barriers to Discharge: None    Equipment recommendations: bedside commode, hip kit     GOALS:   Goals to be met by: 02/25    Patient will increase functional independence with ADLs by performing:    UE Dressing with Supervision.  LE Dressing with Supervision with AD as needed.  Grooming while standing with Supervision.  Toileting from bedside commode with Supervision for hygiene and clothing management.   Stand pivot transfers with Supervision.  Toilet transfer to bedside commode with Supervision.      PLAN:  Patient to be seen 6 x/week to address the above listed problems via self-care/home management, therapeutic activities, therapeutic exercises  Plan of Care reviewed with: patient, spouse         RACHAEL Clark  02/18/2017

## 2017-02-20 NOTE — TELEPHONE ENCOUNTER
----- Message from Eric Anderson MD sent at 2/19/2017  7:17 AM CST -----  Please ensure he is scheduled to see Dr Montalvo for his 2wk f/u, not PA/NP.  Thanks

## 2017-02-21 LAB
BLD PROD TYP BPU: NORMAL
BLD PROD TYP BPU: NORMAL
BLOOD UNIT EXPIRATION DATE: NORMAL
BLOOD UNIT EXPIRATION DATE: NORMAL
BLOOD UNIT TYPE CODE: 5100
BLOOD UNIT TYPE CODE: 5100
BLOOD UNIT TYPE: NORMAL
BLOOD UNIT TYPE: NORMAL
CODING SYSTEM: NORMAL
CODING SYSTEM: NORMAL
DISPENSE STATUS: NORMAL
DISPENSE STATUS: NORMAL
TRANS ERYTHROCYTES VOL PATIENT: NORMAL ML
TRANS ERYTHROCYTES VOL PATIENT: NORMAL ML

## 2017-02-22 ENCOUNTER — TELEPHONE (OUTPATIENT)
Dept: ORTHOPEDICS | Facility: CLINIC | Age: 62
End: 2017-02-22

## 2017-02-22 DIAGNOSIS — M25.552 LEFT HIP PAIN: Primary | ICD-10-CM

## 2017-02-22 NOTE — TELEPHONE ENCOUNTER
----- Message from Ayanna Brantley sent at 2/22/2017 11:09 AM CST -----  Contact: self/home  Pt would like to speak with you regarding a PT referral.

## 2017-02-22 NOTE — TELEPHONE ENCOUNTER
Spoke with  and he informed me that he was unhappy with the  facilty he requested to attend. Mr. Brewster explained to me that he found a new outpatient PT place he wants to attend, he just needs the orders faxed over. I obtained the number and name of the facility and has requested to Dr. Montalvo of the change. I will fax over orders once they are signed and printed out.

## 2017-02-23 DIAGNOSIS — M25.552 LEFT HIP PAIN: Primary | ICD-10-CM

## 2017-02-23 NOTE — PT/OT/SLP PROGRESS
Occupational Therapy  Treatment    Rasta Brewster   MRN: [id number]   Admitting Diagnosis: Prosthetic joint implant failure    OT Date of Treatment: 17   OT Start Time: 930  OT Stop Time: 955  OT Total Time (min): 25 min    Billable Minutes:  Self Care/Home Management 15 and Therapeutic Activity 10    General Precautions: Standard, fall  Orthopedic Precautions: LLE weight bearing as tolerated, LLE posterior precautions    Subjective:  Communicated with RN prior to session.    Pt agreeable to treatment    Pain Ratin/10    Objective:  Patient found with: FCD     Functional Mobility:  Transfers:   Sit <> Stand Assistance: Supervision  Sit <> Stand Assistive Device: Rolling Walker    Functional Ambulation: Spv with RW within room    Activities of Daily Living:  UE Dressing Level of Assistance: Supervision    LE Dressing Level of Assistance: Stand by assistance  Rosendo underwear, pants, doff/rosendo B socks, rosendo shoes using reacher, sock aid, and shoe horn    Therapeutic Activities and Exercises:  Pt educated on   · Maintaining hip precautions upon returning home, safety, and adaptations with ADLs/IADLs with RW  · Safety within the home environment  · Importance of keeping incision site dry until cleared by MD  · Components of Hip Kit and use of AD with LE dressing  · Provided with handout for maintaining posterior hip precautions with sexual activity once appropriate to resume    AM-PAC 6 CLICK ADL   How much help from another person does this patient currently need?   1 = Unable, Total/Dependent Assistance  2 = A lot, Maximum/Moderate Assistance  3 = A little, Minimum/Contact Guard/Supervision  4 = None, Modified [place]/Independent    Putting on and taking off regular lower body clothing? : 3  Bathing (including washing, rinsing, drying)?: 3  Toileting, which includes using toilet, bedpan, or urinal? : 3  Putting on and taking off regular upper body clothing?: 4  Taking care of personal grooming such  as brushing teeth?: 4  Eating meals?: 4  Total Score: 21     AM-PAC Raw Score CMS G-Code Modifier Level of Impairment Assistance   6 % Total / Unable   7 - 9 CM 80 - 100% Maximal Assist   10 - 14 CL 60 - 80% Moderate Assist   15 - 19 CK 40 - 60% Moderate Assist   20 - 22 CJ 20 - 40% Minimal Assist   23 CI 1-20% SBA / CGA   24 CH 0% Independent/ Mod I     Patient left up in chair with all lines intact, call button in reach and RN notified    ASSESSMENT:  Rasta Brewster is a 61 y.o. male with a medical diagnosis of Prosthetic joint implant failure and tolerated session well with good participation. Pt able to recall all posterior hip precautions and able to safely perform ADLs and functional mobility with SPv/SBA and is safe to discharge from OT services.    Rehab identified problem list/impairments: Rehab identified problem list/impairments: impaired functional mobilty, gait instability, decreased lower extremity function, pain, impaired skin, edema    Rehab potential is good.    Activity tolerance: Good    Discharge recommendations: Discharge Facility/Level Of Care Needs: home with home health     Barriers to discharge: Barriers to Discharge: None    Equipment recommendations: bedside commode, hip kit     GOALS:   MET    Plan:  Patient to be seen 6 x/week to address the above listed problems via self-care/home management, therapeutic activities, therapeutic exercises  Plan of Care reviewed with: patient, spouse    RACHAEL Clark  02/19/2017

## 2017-03-03 ENCOUNTER — OFFICE VISIT (OUTPATIENT)
Dept: ORTHOPEDICS | Facility: CLINIC | Age: 62
End: 2017-03-03
Payer: COMMERCIAL

## 2017-03-03 ENCOUNTER — HOSPITAL ENCOUNTER (OUTPATIENT)
Dept: RADIOLOGY | Facility: HOSPITAL | Age: 62
Discharge: HOME | End: 2017-03-03
Attending: ORTHOPAEDIC SURGERY
Payer: COMMERCIAL

## 2017-03-03 VITALS
SYSTOLIC BLOOD PRESSURE: 127 MMHG | HEART RATE: 60 BPM | DIASTOLIC BLOOD PRESSURE: 79 MMHG | BODY MASS INDEX: 27.11 KG/M2 | WEIGHT: 229.63 LBS | HEIGHT: 77 IN | TEMPERATURE: 99 F | RESPIRATION RATE: 18 BRPM

## 2017-03-03 DIAGNOSIS — M16.12 PRIMARY OSTEOARTHRITIS OF LEFT HIP: Primary | ICD-10-CM

## 2017-03-03 DIAGNOSIS — M16.12 PRIMARY OSTEOARTHRITIS OF LEFT HIP: ICD-10-CM

## 2017-03-03 PROCEDURE — 73502 X-RAY EXAM HIP UNI 2-3 VIEWS: CPT | Mod: TC,LT

## 2017-03-03 PROCEDURE — 99999 PR PBB SHADOW E&M-EST. PATIENT-LVL III: CPT | Mod: PBBFAC,,, | Performed by: ORTHOPAEDIC SURGERY

## 2017-03-03 PROCEDURE — 73502 X-RAY EXAM HIP UNI 2-3 VIEWS: CPT | Mod: 26,LT,, | Performed by: RADIOLOGY

## 2017-03-03 PROCEDURE — 99024 POSTOP FOLLOW-UP VISIT: CPT | Mod: S$GLB,,, | Performed by: ORTHOPAEDIC SURGERY

## 2017-03-03 RX ORDER — ASPIRIN 325 MG
325 TABLET, DELAYED RELEASE (ENTERIC COATED) ORAL 2 TIMES DAILY
Refills: 0 | COMMUNITY
Start: 2017-02-19 | End: 2017-03-03 | Stop reason: SDUPTHER

## 2017-03-03 NOTE — PROGRESS NOTES
Rasta Brewster presents for post-operative evaluation.  He is status-post  left Hip arthroplasty revision.  The wound is healing well with no signs of erythema or warmth.  There is no drainage.  No clinical signs or symptoms of infection are present. ROM near full.    Post-operative radiographs were obtained today and show satisfactory position of the prosthesis.    We will continue post-operative physical therapy, and the patient was instructed to continue anticoagulation for a total of 1 month post-operatively.    Follow-up in 4 weeks.

## 2017-03-03 NOTE — MR AVS SNAPSHOT
[facility]  [address]  [facility] [address]  Phone: [phone]                  Rasta Brewster   3/3/2017 10:00 AM   Office Visit    Description:  Male : 1955   Provider:  Alec Montalvo MD   Department:  [facility]           Reason for Visit     Post-op Evaluation           Diagnoses this Visit        Comments    Primary osteoarthritis of left hip    -  Primary            To Do List           Future Appointments        Provider Department Dept Phone    3/31/2017 10:00 AM Alec Montalvo MD [facility] [phone]      Goals (5 Years of Data)     None      Follow-Up and Disposition     Return in about 4 weeks (around 3/31/2017).    Follow-up and Disposition History      [facility]     [facility] -  Assistance  Registered nurses in the [facility] provide clinical advisement, health education, appointment booking, and other advisory services.  Call for this free service at [phone].             Medications           Message regarding Medications     Verify the changes and/or additions to your medication regime listed below are the same as discussed with your clinician today.  If any of these changes or additions are incorrect, please notify your healthcare provider.        STOP taking these medications     aspirin (ECOTRIN) 81 MG EC tablet Take 81 mg by mouth every morning.     docusate sodium (COLACE) 100 MG capsule Take 1 capsule (100 mg total) by mouth 2 (two) times daily as needed for Constipation.    ondansetron (ZOFRAN) 8 MG tablet Take 1 tablet (8 mg total) by mouth every 12 (twelve) hours as needed for Nausea.    oxycodone-acetaminophen (PERCOCET)  mg per tablet Take 1 tablet by mouth every 4 (four) hours as needed for Pain.    aspirin (ECOTRIN) 325 MG EC tablet Take 325 mg by mouth 2 (two) times daily.           Verify that the below list of medications is an accurate  "representation of the medications you are currently taking.  If none reported, the list may be blank. If incorrect, please contact your healthcare provider. Carry this list with you in case of emergency.           Current Medications     aspirin 325 MG tablet Take 1 tablet (325 mg total) by mouth 2 (two) times daily.    eszopiclone (LUNESTA) 1 MG Tab 3 mg nightly as needed.     eszopiclone 3 mg Tab 3 mg every evening.     ibuprofen (ADVIL) 200 MG tablet Take 800 mg by mouth daily as needed for Pain.     losartan (COZAAR) 50 MG tablet Take 50 mg by mouth every morning.     TRINTELLIX 20 mg Tab every morning.     ERGOCALCIFEROL, VITAMIN D2, (VITAMIN D2 ORAL) Take 1,000 Units by mouth every morning.           Clinical Reference Information           Your Vitals Were     BP Pulse Temp Resp Height Weight    127/79 (BP Location: Right arm, Patient Position: Sitting, BP Method: Automatic) 60 98.6 °F (37 °C) (Oral) 18 6' 5.17" (1.96 m) 104.2 kg (229 lb 9.8 oz)    BMI                27.11 kg/m2          Blood Pressure          Most Recent Value    BP  127/79      Allergies as of 3/3/2017     No Known Allergies      Immunizations Administered on Date of Encounter - 3/3/2017     None      Orders Placed During Today's Visit     Future Labs/Procedures Expected by Expires    X-Ray Hip 2 View Left  3/3/2017 3/3/2018      [facility] Sign-Up     Activating your [facility] account is as easy as 1-2-3!     1) Visit [email or web address], select Sign Up Now, enter this activation code and your date of birth, then select Next.  [email or web address]  Expires: 3/30/2017  9:24 AM      2) Create a username and password to use when you visit [facility] in the future and select a security question in case you lose your password and select Next.    3) Enter your e-mail address and click Sign Up!    Additional Information  If you have questions, please e-mail [email or web address] or call [phone] to talk to our [facility] staff. Remember, [facility] is " NOT to be used for urgent needs. For medical emergencies, dial 911.         Language Assistance Services     ATTENTION: Language assistance services are available, free of charge. Please call [phone].      ATENCIÓN: Si tiffanie nevarez, tiene a austin disposición servicios gratuitos de asistencia lingüística. Llame al [phone].     CHÚ Ý: N?u b?n nói Ti?ng Vi?t, có các d?ch v? h? tr? ngôn ng? mi?n phí dành cho b?n. G?i s? [phone].         [facility] complies with applicable Federal civil rights laws and does not discriminate on the basis of race, color, national origin, age, disability, or sex.

## 2017-03-31 ENCOUNTER — OFFICE VISIT (OUTPATIENT)
Dept: ORTHOPEDICS | Facility: CLINIC | Age: 62
End: 2017-03-31
Payer: COMMERCIAL

## 2017-03-31 ENCOUNTER — HOSPITAL ENCOUNTER (OUTPATIENT)
Dept: RADIOLOGY | Facility: HOSPITAL | Age: 62
Discharge: HOME | End: 2017-03-31
Attending: ORTHOPAEDIC SURGERY
Payer: COMMERCIAL

## 2017-03-31 VITALS — WEIGHT: 227.63 LBS | HEIGHT: 77 IN | BODY MASS INDEX: 26.88 KG/M2

## 2017-03-31 DIAGNOSIS — M25.552 LEFT HIP PAIN: Primary | ICD-10-CM

## 2017-03-31 DIAGNOSIS — M25.552 LEFT HIP PAIN: ICD-10-CM

## 2017-03-31 PROCEDURE — 73502 X-RAY EXAM HIP UNI 2-3 VIEWS: CPT | Mod: TC,LT

## 2017-03-31 PROCEDURE — 73502 X-RAY EXAM HIP UNI 2-3 VIEWS: CPT | Mod: 26,LT,, | Performed by: RADIOLOGY

## 2017-03-31 PROCEDURE — 99024 POSTOP FOLLOW-UP VISIT: CPT | Mod: S$GLB,,, | Performed by: ORTHOPAEDIC SURGERY

## 2017-03-31 PROCEDURE — 99999 PR PBB SHADOW E&M-EST. PATIENT-LVL III: CPT | Mod: PBBFAC,,, | Performed by: ORTHOPAEDIC SURGERY

## 2017-03-31 RX ORDER — NAPROXEN SODIUM 220 MG/1
81 TABLET, FILM COATED ORAL DAILY
COMMUNITY

## 2017-03-31 NOTE — MR AVS SNAPSHOT
[facility]  [address]  [facility] [address]  Phone: [phone]                  Rasta Brewster   3/31/2017 10:00 AM   Office Visit    Description:  Male : 1955   Provider:  Alec Montalvo MD   Department:  [facility]           Reason for Visit     Post-op Evaluation           Diagnoses this Visit        Comments    Left hip pain    -  Primary            To Do List           Goals (5 Years of Data)     None      Follow-Up and Disposition     Return in about 6 weeks (around 2017).    Follow-up and Disposition History      [facility]     [facility] On [facility] Nurse Care Line -  Assistance  Unless otherwise directed by your provider, please contact [facility], our nurse care line that is available for  assistance.     Registered nurses in the [facility] On Call Center provide: appointment scheduling, clinical advisement, health education, and other advisory services.  Call: [phone] (toll free)               Medications           Message regarding Medications     Verify the changes and/or additions to your medication regime listed below are the same as discussed with your clinician today.  If any of these changes or additions are incorrect, please notify your healthcare provider.             Verify that the below list of medications is an accurate representation of the medications you are currently taking.  If none reported, the list may be blank. If incorrect, please contact your healthcare provider. Carry this list with you in case of emergency.           Current Medications     aspirin 81 MG Chew Take 81 mg by mouth once daily.    ERGOCALCIFEROL, VITAMIN D2, (VITAMIN D2 ORAL) Take 1,000 Units by mouth every morning.    eszopiclone (LUNESTA) 1 MG Tab 3 mg nightly as needed.     eszopiclone 3 mg Tab 3 mg every evening.     ibuprofen (ADVIL) 200 MG tablet Take 800 mg by mouth daily as needed for Pain.     losartan (COZAAR) 50 MG tablet  "Take 50 mg by mouth every morning.     TRINTELLIX 20 mg Tab every morning.            Clinical Reference Information           Your Vitals Were     Height Weight BMI          6' 5" (1.956 m) 103.3 kg (227 lb 10 oz) 26.99 kg/m2        Allergies as of 3/31/2017     No Known Allergies      Immunizations Administered on Date of Encounter - 3/31/2017     None      Orders Placed During Today's Visit     Future Labs/Procedures Expected by Expires    X-Ray Hip 2 View Left  3/31/2017 3/31/2018      [facility] Sign-Up     Activating your [facility] account is as easy as 1-2-3!     1) Visit [email or web address], select Sign Up Now, enter this activation code and your date of birth, then select Next.  [email or web address]  Expires: 5/15/2017  1:08 PM      2) Create a username and password to use when you visit [facility] in the future and select a security question in case you lose your password and select Next.    3) Enter your e-mail address and click Sign Up!    Additional Information  If you have questions, please e-mail [email or web address] or call [phone] to talk to our [facility] staff. Remember, [facility] is NOT to be used for urgent needs. For medical emergencies, dial 911.         Language Assistance Services     ATTENTION: Language assistance services are available, free of charge. Please call [phone].      ATENCIÓN: Si habla español, tiene a autsin disposición servicios gratuitos de asistencia lingüística. Llame al [phone].     [facility] Ý: N?u b?n nói Ti?ng Vi?t, có các d?ch v? h? tr? ngôn ng? mi?n phí dành cho b?n. G?i s? [phone].         [facility] complies with applicable Federal civil rights laws and does not discriminate on the basis of race, color, national origin, age, disability, or sex.        "

## 2017-03-31 NOTE — PROGRESS NOTES
Rasta Brewster presents for post-operative evaluation.  He is status-post  Revision left hip.  The wound is healing well with no signs of erythema or warmth.  There is no drainage.  No clinical signs or symptoms of infection are present.    ROM near normal.    Post-operative radiographs were obtained today and show satisfactory position of the prosthesis.    We will continue post-operative physical therapy.    Follow-up in 6 weeks.

## 2018-02-02 ENCOUNTER — PATIENT OUTREACH (OUTPATIENT)
Dept: ORTHOPEDICS | Facility: CLINIC | Age: 63
End: 2018-02-02

## 2025-03-05 NOTE — PATIENT INSTRUCTIONS
Your surgery has been scheduled for:____Friday 2/17____     You should report to:  _____[facility], located on the [place] side of the first floor of the [facility] ([phone])  ___X___[facility], located on the [facility] side of the Second floor of the [facility] ([phone])  ______3rd Floor [facility] located on the [facility] side of the [facility] [phone]    Please Note  -Tell your doctors if you take asprin, products containing aspirin, herbal medications or blood thinners, such as Coumadin, Ticlid, or Plavix. (Consult your provider regarding holding or stopping before surgery).  -Arrange for someone to drive you home following surgery. You will not be allowed to leave the surgical facility alone or drive yourself home following sedation and anesthesia.      Outpatient Encounter Prescriptions as of 2/14/2017   Medication Sig Note Dispense Refill    aspirin (ECOTRIN) 81 MG EC tablet Take 81 mg by mouth every morning.  2/14/2017: Take AM of surgery      ERGOCALCIFEROL, VITAMIN D2, (VITAMIN D2 ORAL) Take 1,000 Units by mouth every morning. 2/14/2017: Hold AM of surgery      eszopiclone (LUNESTA) 1 MG Tab 1 mg nightly as needed.  2/14/2017: Take as needed      eszopiclone 3 mg Tab 3 mg every evening.  2/14/2017: Take as sched PM      ibuprofen (ADVIL) 200 MG tablet Take 200 mg by mouth every 6 (six) hours as needed for Pain. 2/14/2017: Hold until after surgery      losartan (COZAAR) 50 MG tablet Take 50 mg by mouth every morning.  2/14/2017: Hold AM of surgery      TRINTELLIX 20 mg Tab every morning.  2/14/2017: Take AM of surgery                  1     No facility-administered encounter medications on file as of 2/14/2017.          Please review the instructions regarding your above listed medications.    Before Surgery  -Stop taking all herbal medications 14 days prior to surgery  -Stop taking asprin,  products containing asprin 7 days before surgery  -Stop taking blood thinners   days before surgery  -Refrain from drinking alcoholic beverages for 24 hours before and after surgery  -Stop or limit smoking   days before surgery    Night before Surgery  -DO NOT EAT OR DRINK ANYTHING AFTER MIDNIGHT, INCLUDING GUM, HARD CANDY, MINTS, OR CHEWING TOBACCO  -Take a shower or bath (shower is recommended). Bathe with Hibiciens soap or an antibacterial soap from the neck down. If not supplied by your surgeon, Hibiciens soap will need to be purchased over the counter in the pharmacy. Rinse soap off thoroughly.  -Shampoo your hair with your regular shampoo    The Day of Surgery  -Take another bath or shower with Hibiciens or any antibacterial soap, to reduce the chance of infection.  -Take heart and blood pressure medications with a small sip of water, as advised by the perioperative team.  -Do not take fluid pills  -You may brush your teeth and rinse your mouth, but do not swallow any additional water.  -Do not apply perfumes, powder, body lotions, or deodorant on the day of surgery.  -Nail polish should be removed  -Do not wear makeup or moisturizer  -Wear comfortable clothes, such as a button front shirt and loose fitting pants.  -Leave all jewelry, including body piercings, and valuables at home.  -Bring any devices you will need after surgery such as crutches or canes.  -If you have sleep apnea, please bring your CPAP machine    In the event of your physical conditions including the onset of a cold or respiratory illness, or if you have to delay or cancel your surgery, please notify your surgeon.     If you have any questions or concerns, please don't hesitate to call.    Sincerely,    Yisel [phone]     Improved

## (undated) DEVICE — DRAPE SURG STERI INCISE XLG.

## (undated) DEVICE — SEE MEDLINE ITEM 152530

## (undated) DEVICE — HOOD T-5 TEAR AWAY STERILE

## (undated) DEVICE — MARKER SKIN STND TIP BLUE BARR

## (undated) DEVICE — SUT VICRYL CTD 2-0 GI 27 SH

## (undated) DEVICE — ELECTRODE REM PLYHSV RETURN 9

## (undated) DEVICE — KIT IRR SUCTION HND PIECE

## (undated) DEVICE — BLADE 4 INCH EDGE UN-INS

## (undated) DEVICE — Device

## (undated) DEVICE — SEE L#156916

## (undated) DEVICE — CLOSURE SKIN STERI STRIP 1/2X4

## (undated) DEVICE — WAX BONE STERILE 2.5G

## (undated) DEVICE — SUT VICRYL PLUS 3-0 SH 18IN

## (undated) DEVICE — TRAY FOLEY 16FR INFECTION CONT

## (undated) DEVICE — SEE MEDLINE ITEM 154981

## (undated) DEVICE — SUT MONOCRYL 3-0 PS-2 UND

## (undated) DEVICE — DERMABOND SKIN ADHESIVE PROPEN

## (undated) DEVICE — DRESSING ABSRBNT ISLAND 3.6X8

## (undated) DEVICE — DRESSING TRANS 4X4 TEGADERM

## (undated) DEVICE — SUT ETHIBOND XTRA 1 CTX

## (undated) DEVICE — SUT VICRYL BR 1 GEN 27 CT-1

## (undated) DEVICE — MASK FLYTE HOOD PEEL AWAY

## (undated) DEVICE — IMPLANTABLE DEVICE
Type: IMPLANTABLE DEVICE | Site: HIP | Status: NON-FUNCTIONAL
Removed: 2017-02-17

## (undated) DEVICE — SEE MEDLINE ITEM 152487

## (undated) DEVICE — SPONGE DERMACEA 4X4IN 12PLY

## (undated) DEVICE — ADHESIVE DERMABOND ADVANCED

## (undated) DEVICE — DRESSING AQUACEL EXTRA 10X12.5

## (undated) DEVICE — NDL 18GA X1 1/2 REG BEVEL

## (undated) DEVICE — IMMOBILIZER KNEE 19INUNIVERSAL

## (undated) DEVICE — SUT CTD VICRYL 1 UND BR

## (undated) DEVICE — SUT VICRYL PLUS 0 CT1 18IN

## (undated) DEVICE — BANDAID STRIP PLASTIC 3/4X3

## (undated) DEVICE — PILLOW ABDUCTION FOAM MED